# Patient Record
Sex: FEMALE | NOT HISPANIC OR LATINO | Employment: OTHER | URBAN - METROPOLITAN AREA
[De-identification: names, ages, dates, MRNs, and addresses within clinical notes are randomized per-mention and may not be internally consistent; named-entity substitution may affect disease eponyms.]

---

## 2017-01-09 ENCOUNTER — TRANSCRIBE ORDERS (OUTPATIENT)
Dept: ADMINISTRATIVE | Facility: HOSPITAL | Age: 82
End: 2017-01-09

## 2017-01-09 DIAGNOSIS — N39.0 URINARY TRACT INFECTION WITHOUT HEMATURIA, SITE UNSPECIFIED: Primary | ICD-10-CM

## 2017-01-10 ENCOUNTER — HOSPITAL ENCOUNTER (OUTPATIENT)
Dept: GENERAL RADIOLOGY | Facility: HOSPITAL | Age: 82
Discharge: HOME | End: 2017-01-10
Attending: UROLOGY | Admitting: UROLOGY

## 2017-01-10 VITALS
BODY MASS INDEX: 23.74 KG/M2 | SYSTOLIC BLOOD PRESSURE: 131 MMHG | WEIGHT: 134 LBS | HEART RATE: 60 BPM | HEIGHT: 63 IN | DIASTOLIC BLOOD PRESSURE: 62 MMHG | OXYGEN SATURATION: 98 % | RESPIRATION RATE: 16 BRPM

## 2017-01-10 DIAGNOSIS — Z45.2 PICC (PERIPHERALLY INSERTED CENTRAL CATHETER) IN PLACE: ICD-10-CM

## 2017-01-10 DIAGNOSIS — N39.0 URINARY TRACT INFECTION WITHOUT HEMATURIA, SITE UNSPECIFIED: ICD-10-CM

## 2017-01-10 PROCEDURE — 76937 US GUIDE VASCULAR ACCESS: CPT

## 2017-01-10 PROCEDURE — 25010000002 GENTAMICIN PER 80 MG: Performed by: UROLOGY

## 2017-01-10 PROCEDURE — 77001 FLUOROGUIDE FOR VEIN DEVICE: CPT

## 2017-01-10 RX ORDER — GENTAMICIN IN NACL, ISO-OSM 80MG/100ML
3 INTRAVENOUS SOLUTION, PIGGYBACK (ML) INTRAVENOUS ONCE
Status: DISCONTINUED | OUTPATIENT
Start: 2017-01-10 | End: 2017-01-10 | Stop reason: SDUPTHER

## 2017-01-10 RX ORDER — LIDOCAINE WITH 8.4% SOD BICARB 0.9%(10ML)
20 SYRINGE (ML) INJECTION ONCE
Status: COMPLETED | OUTPATIENT
Start: 2017-01-10 | End: 2017-01-10

## 2017-01-10 RX ORDER — GENTAMICIN IN NACL, ISO-OSM 80MG/100ML
3 INTRAVENOUS SOLUTION, PIGGYBACK (ML) INTRAVENOUS ONCE
Status: COMPLETED | OUTPATIENT
Start: 2017-01-10 | End: 2017-01-10

## 2017-01-10 RX ADMIN — GENTAMICIN SULFATE 160 MG: 80 INJECTION, SOLUTION INTRAVENOUS at 11:27

## 2017-01-10 RX ADMIN — Medication 9 ML: at 10:57

## 2017-09-04 ENCOUNTER — OFFICE VISIT (OUTPATIENT)
Dept: RETAIL CLINIC | Facility: CLINIC | Age: 82
End: 2017-09-04

## 2017-09-04 VITALS
RESPIRATION RATE: 16 BRPM | TEMPERATURE: 98 F | HEART RATE: 70 BPM | OXYGEN SATURATION: 98 % | SYSTOLIC BLOOD PRESSURE: 130 MMHG | DIASTOLIC BLOOD PRESSURE: 68 MMHG

## 2017-09-04 DIAGNOSIS — J01.00 ACUTE NON-RECURRENT MAXILLARY SINUSITIS: Primary | ICD-10-CM

## 2017-09-04 PROCEDURE — 99213 OFFICE O/P EST LOW 20 MIN: CPT | Performed by: NURSE PRACTITIONER

## 2017-09-04 RX ORDER — AMOXICILLIN 500 MG/1
500 CAPSULE ORAL 3 TIMES DAILY
Qty: 30 CAPSULE | Refills: 0 | Status: SHIPPED | OUTPATIENT
Start: 2017-09-04 | End: 2017-09-14

## 2017-09-04 RX ORDER — FLUTICASONE PROPIONATE 50 MCG
2 SPRAY, SUSPENSION (ML) NASAL DAILY
Qty: 1 BOTTLE | Refills: 0 | Status: SHIPPED | OUTPATIENT
Start: 2017-09-04 | End: 2017-10-26

## 2017-09-04 NOTE — PROGRESS NOTES
Subjective   Rossana Gomez is a 82 y.o. female.     Headache    This is a new problem. The current episode started in the past 7 days (3 days ). The problem has been unchanged. The pain is located in the bilateral region. The pain quality is similar to prior headaches. The quality of the pain is described as aching. The pain is mild. Associated symptoms include coughing and sinus pressure. Pertinent negatives include no fever, sore throat or vomiting. Treatments tried: victor hugo selbeeer  The treatment provided mild relief.   Cough   Associated symptoms include chills and headaches. Pertinent negatives include no fever or sore throat.        The following portions of the patient's history were reviewed and updated as appropriate: allergies, current medications, past family history, past medical history, past social history, past surgical history and problem list.    Review of Systems   Constitutional: Positive for chills. Negative for fever.   HENT: Positive for congestion and sinus pressure. Negative for sore throat.    Eyes: Negative.    Respiratory: Positive for cough.    Cardiovascular: Negative.    Gastrointestinal: Negative.  Negative for vomiting.   Endocrine: Negative.    Genitourinary: Negative.    Musculoskeletal: Negative.    Skin: Negative.    Allergic/Immunologic: Negative.    Neurological: Positive for headaches.   Hematological: Negative.    Psychiatric/Behavioral: Negative.        Objective   Physical Exam   Constitutional: She is oriented to person, place, and time. Vital signs are normal. She appears well-developed and well-nourished.   HENT:   Head: Normocephalic and atraumatic.   Right Ear: Hearing, tympanic membrane, external ear and ear canal normal.   Left Ear: Hearing, tympanic membrane, external ear and ear canal normal.   Nose: Right sinus exhibits maxillary sinus tenderness and frontal sinus tenderness. Left sinus exhibits maxillary sinus tenderness and frontal sinus tenderness.    Mouth/Throat: Uvula is midline, oropharynx is clear and moist and mucous membranes are normal. No tonsillar exudate.   Eyes: Conjunctivae and lids are normal. Pupils are equal, round, and reactive to light.   Neck: Trachea normal and normal range of motion. Neck supple.   Cardiovascular: Normal rate, regular rhythm, S1 normal, S2 normal and normal heart sounds.    Pulmonary/Chest: Effort normal and breath sounds normal. No respiratory distress.   Abdominal: Soft. Normal appearance and bowel sounds are normal. There is no tenderness.   Musculoskeletal: Normal range of motion.   Lymphadenopathy:     She has no cervical adenopathy.   Neurological: She is alert and oriented to person, place, and time.   Skin: Skin is warm, dry and intact. No rash noted.   Psychiatric: She has a normal mood and affect. Her speech is normal and behavior is normal.   Vitals reviewed.      Assessment/Plan   Problems Addressed this Visit     None      Visit Diagnoses     Acute non-recurrent maxillary sinusitis    -  Primary          amoxicllin 500 mg po tid x 10 days   flonase as directed

## 2017-10-24 ENCOUNTER — TRANSCRIBE ORDERS (OUTPATIENT)
Dept: ADMINISTRATIVE | Facility: HOSPITAL | Age: 82
End: 2017-10-24

## 2017-10-24 DIAGNOSIS — N39.0 URINARY TRACT INFECTION WITHOUT HEMATURIA, SITE UNSPECIFIED: Primary | ICD-10-CM

## 2017-10-26 ENCOUNTER — HOSPITAL ENCOUNTER (OUTPATIENT)
Dept: GENERAL RADIOLOGY | Facility: HOSPITAL | Age: 82
Discharge: HOME | End: 2017-10-26
Attending: UROLOGY | Admitting: UROLOGY

## 2017-10-26 VITALS
HEIGHT: 63 IN | DIASTOLIC BLOOD PRESSURE: 60 MMHG | WEIGHT: 130 LBS | TEMPERATURE: 97.9 F | BODY MASS INDEX: 23.04 KG/M2 | HEART RATE: 61 BPM | SYSTOLIC BLOOD PRESSURE: 132 MMHG | OXYGEN SATURATION: 99 % | RESPIRATION RATE: 16 BRPM

## 2017-10-26 DIAGNOSIS — N39.0 URINARY TRACT INFECTION WITHOUT HEMATURIA, SITE UNSPECIFIED: ICD-10-CM

## 2017-10-26 PROCEDURE — 96365 THER/PROPH/DIAG IV INF INIT: CPT

## 2017-10-26 PROCEDURE — 76937 US GUIDE VASCULAR ACCESS: CPT

## 2017-10-26 PROCEDURE — C1751 CATH, INF, PER/CENT/MIDLINE: HCPCS

## 2017-10-26 PROCEDURE — 77001 FLUOROGUIDE FOR VEIN DEVICE: CPT

## 2017-10-26 PROCEDURE — 25010000002 GENTAMICIN PER 80 MG: Performed by: UROLOGY

## 2017-10-26 RX ORDER — PRAVASTATIN SODIUM 20 MG
20 TABLET ORAL DAILY
COMMUNITY
End: 2019-06-27

## 2017-10-26 RX ORDER — SERTRALINE HYDROCHLORIDE 100 MG/1
100 TABLET, FILM COATED ORAL DAILY
COMMUNITY

## 2017-10-26 RX ORDER — LIDOCAINE HYDROCHLORIDE 10 MG/ML
10 INJECTION, SOLUTION INFILTRATION; PERINEURAL ONCE
Status: COMPLETED | OUTPATIENT
Start: 2017-10-26 | End: 2017-10-26

## 2017-10-26 RX ORDER — TEMAZEPAM 15 MG/1
15 CAPSULE ORAL NIGHTLY PRN
COMMUNITY
End: 2020-01-02

## 2017-10-26 RX ADMIN — GENTAMICIN SULFATE 180 MG: 40 INJECTION, SOLUTION INTRAMUSCULAR; INTRAVENOUS at 10:46

## 2017-10-26 RX ADMIN — LIDOCAINE HYDROCHLORIDE 8 ML: 10 INJECTION, SOLUTION INFILTRATION; PERINEURAL at 10:23

## 2017-10-26 NOTE — PROGRESS NOTES
Pharmacokinetic Consult - Aminoglycoside Dosing (Initial Note)    Rossana Gomez has been consulted for gentamicin (beginning second dose) dosing for UTI.   Pharmacy dosing per Dr. North Silva's request.   Dosing method: once daily  Goal trough: <1 mg/L      Relevant clinical data and objective history reviewed:  82 y.o. female         Ideal body weight: 115 lb 8.3 oz (52.4 kg)  Adjusted ideal body weight: 121 lb 5 oz (55 kg)      @LABBRIEF(CREATININE)@  CrCl cannot be calculated (No order found.).     No results found for: WBC  Temp Readings from Last 3 Encounters:   10/26/17 97.9 °F (36.6 °C) (Oral)   09/04/17 98 °F (36.7 °C) (Oral)   05/11/16 98.1 °F (36.7 °C) (Oral)     Baseline culture/source/susceptibility:   10/21 NICHOLE UCx: ESBL E.coli (see scanned H&P under media)    Assessment/Plan  1. Will give Gentamicin 180 mg (3 mg/kg, dosing weight of 59 kg) as ordered by MD today. Patient's last SCr in April was 1.0, considering this pt's baseline CrCl is around 37 mL/min. According to [facility] aminoglycosides dosing guidelines, pt would be a candidate for Q48h dosing schedule. Will order serum creatinine and gentamicin level tomorrow prior to administering second gentamicin dose.   2. Will monitor serum creatinine every visit while on gentamicin therapy.    Pharmacy will continue to follow daily while on an aminoglycoside and adjust as needed.  Thank you for allowing us to participate in this patient's care.      Ysabel Ziegler, ValentinD

## 2017-10-26 NOTE — NURSING NOTE
Given picc ID card to patient and given schedule of appointments for antibiotics in [facility]U. Discharged with spouse to car.

## 2017-10-26 NOTE — NURSING NOTE
Status post picc line to right upper arm with dressing dry and intact to site. Good blood return noted. Flushed picc with 10 cc normal saline before and after antibiotic.

## 2017-10-27 ENCOUNTER — HOSPITAL ENCOUNTER (OUTPATIENT)
Dept: INFUSION THERAPY | Facility: HOSPITAL | Age: 82
Discharge: HOME | End: 2017-10-27
Admitting: UROLOGY

## 2017-10-27 VITALS
RESPIRATION RATE: 16 BRPM | TEMPERATURE: 98.5 F | SYSTOLIC BLOOD PRESSURE: 138 MMHG | HEART RATE: 61 BPM | DIASTOLIC BLOOD PRESSURE: 65 MMHG | OXYGEN SATURATION: 96 %

## 2017-10-27 DIAGNOSIS — N39.0 URINARY TRACT INFECTION WITHOUT HEMATURIA, SITE UNSPECIFIED: ICD-10-CM

## 2017-10-27 LAB
CREAT BLD-MCNC: 0.96 MG/DL (ref 0.57–1)
GENTAMICIN SERPL-MCNC: 0.6 MCG/ML (ref 0.5–2)
GFR SERPL CREATININE-BSD FRML MDRD: 56 ML/MIN/1.73

## 2017-10-27 PROCEDURE — 25010000002 GENTAMICIN PER 80 MG: Performed by: UROLOGY

## 2017-10-27 PROCEDURE — 82565 ASSAY OF CREATININE: CPT | Performed by: UROLOGY

## 2017-10-27 PROCEDURE — 96365 THER/PROPH/DIAG IV INF INIT: CPT

## 2017-10-27 PROCEDURE — 80170 ASSAY OF GENTAMICIN: CPT | Performed by: UROLOGY

## 2017-10-27 PROCEDURE — 36592 COLLECT BLOOD FROM PICC: CPT

## 2017-10-27 RX ADMIN — GENTAMICIN SULFATE 180 MG: 40 INJECTION, SOLUTION INTRAMUSCULAR; INTRAVENOUS at 16:34

## 2017-10-27 NOTE — PROGRESS NOTES
Pharmacy to dose gentamicin: Follow-up    Day 2 of 7 planned  Consult for Dr Silva  Treating: UTI    Relevant clinical data and objective history reviewed:  82 y.o. female        Lab Results   Component Value Date    CREATININE 0.96 10/27/2017     Estimated Creatinine Clearance: 43.5 mL/min (by C-G formula based on Cr of 0.96).    No results found for: WBC  Temp Readings from Last 1 Encounters:   10/27/17 98.5 °F (36.9 °C) (Oral)     IV Anti-Infectives     Ordered     Dose/Rate Route Frequency Start Stop    10/27/17 1501  gentamicin (GARAMYCIN) 180 mg in sodium chloride 0.9 % 100 mL IVPB     Ordering Provider:  North Silva MD    180 mg  200 mL/hr over 30 Minutes Intravenous Once 10/27/17 1615 10/27/17 1709    10/27/17 1501  Pharmacy to Dose gentamicin (GARAMYCIN)     Ordering Provider:  North Silva MD     Does not apply Continuous PRN 10/27/17 1501           Baseline cultures/labs/radiology:  No labs available here    Gentamicin dosing history   10/26 1046: 180mg IV x1  10/27 1515 trough = 0.6; 180mg IV x1 at 1634    Lab Results   Component Value Date    GENTTROUGH 0.60 10/27/2017     No results found for: GENTRANDOM    Assessment/Plan:   Scr stable today and gentamicin trough is within goal of < 2 mcg/ml. Dosing is in between conventional and extended interval dosing, but given trough is OK today, continue at 3mg/kg dose q24h. Scr due tomorrow AM, and trough level is next due prior to 10/29 dose.  Will monitor serum creatinine every 24 hours for the first 3 days then at least every 48 hours per dosing recommendations. RN/physician should monitor for signs and symptoms of nephrotoxicity and/or ototoxicity including tinnitus, roaring, ringing or buzzing in ears, or nausea/vomiting, dizziness, and vertigo. Pharmacy will continue to follow daily while on an aminoglycoside and adjust as needed.  Thank you,    Alexa Fox, PharmD, BCPS  10/27/17 5:48 PM

## 2017-10-27 NOTE — PROGRESS NOTES
Patient tolerated infusion without complaint. Right PICC flushed with NS and patient discharged ambulatory at 1710.

## 2017-10-27 NOTE — PROGRESS NOTES
Lab results called to Alexa, Pharmacist and ok received to give 180 mg dose Gentamicin IV and to continue daily serum creatinines prior to giving medication.

## 2017-10-28 ENCOUNTER — HOSPITAL ENCOUNTER (OUTPATIENT)
Dept: INFUSION THERAPY | Facility: HOSPITAL | Age: 82
Discharge: HOME | End: 2017-10-28
Admitting: UROLOGY

## 2017-10-28 VITALS
TEMPERATURE: 97.2 F | OXYGEN SATURATION: 97 % | SYSTOLIC BLOOD PRESSURE: 133 MMHG | RESPIRATION RATE: 18 BRPM | HEART RATE: 64 BPM | DIASTOLIC BLOOD PRESSURE: 63 MMHG

## 2017-10-28 DIAGNOSIS — N39.0 URINARY TRACT INFECTION WITHOUT HEMATURIA, SITE UNSPECIFIED: ICD-10-CM

## 2017-10-28 LAB
CREAT BLD-MCNC: 1.08 MG/DL (ref 0.57–1)
GFR SERPL CREATININE-BSD FRML MDRD: 49 ML/MIN/1.73

## 2017-10-28 PROCEDURE — 25010000002 GENTAMICIN PER 80 MG: Performed by: UROLOGY

## 2017-10-28 PROCEDURE — 82565 ASSAY OF CREATININE: CPT | Performed by: UROLOGY

## 2017-10-28 PROCEDURE — 36592 COLLECT BLOOD FROM PICC: CPT

## 2017-10-28 PROCEDURE — 96365 THER/PROPH/DIAG IV INF INIT: CPT

## 2017-10-28 RX ADMIN — GENTAMICIN SULFATE 180 MG: 40 INJECTION, SOLUTION INTRAMUSCULAR; INTRAVENOUS at 09:01

## 2017-10-29 ENCOUNTER — HOSPITAL ENCOUNTER (OUTPATIENT)
Dept: INFUSION THERAPY | Facility: HOSPITAL | Age: 82
Discharge: HOME | End: 2017-10-29
Admitting: UROLOGY

## 2017-10-29 VITALS
TEMPERATURE: 97.5 F | OXYGEN SATURATION: 97 % | RESPIRATION RATE: 16 BRPM | SYSTOLIC BLOOD PRESSURE: 149 MMHG | DIASTOLIC BLOOD PRESSURE: 68 MMHG | HEART RATE: 65 BPM

## 2017-10-29 DIAGNOSIS — N39.0 URINARY TRACT INFECTION WITHOUT HEMATURIA, SITE UNSPECIFIED: ICD-10-CM

## 2017-10-29 LAB
CREAT BLD-MCNC: 0.98 MG/DL (ref 0.57–1)
GENTAMICIN SERPL-MCNC: 1.45 MCG/ML (ref 0.5–2)
GFR SERPL CREATININE-BSD FRML MDRD: 55 ML/MIN/1.73

## 2017-10-29 PROCEDURE — 96365 THER/PROPH/DIAG IV INF INIT: CPT

## 2017-10-29 PROCEDURE — 80170 ASSAY OF GENTAMICIN: CPT | Performed by: UROLOGY

## 2017-10-29 PROCEDURE — 82565 ASSAY OF CREATININE: CPT | Performed by: UROLOGY

## 2017-10-29 PROCEDURE — 25010000002 GENTAMICIN PER 80 MG: Performed by: UROLOGY

## 2017-10-29 PROCEDURE — 36592 COLLECT BLOOD FROM PICC: CPT

## 2017-10-29 RX ADMIN — GENTAMICIN SULFATE 180 MG: 40 INJECTION, SOLUTION INTRAMUSCULAR; INTRAVENOUS at 09:25

## 2017-10-30 ENCOUNTER — HOSPITAL ENCOUNTER (OUTPATIENT)
Dept: INFUSION THERAPY | Facility: HOSPITAL | Age: 82
Discharge: HOME | End: 2017-10-30
Admitting: UROLOGY

## 2017-10-30 VITALS
TEMPERATURE: 98.5 F | DIASTOLIC BLOOD PRESSURE: 64 MMHG | SYSTOLIC BLOOD PRESSURE: 156 MMHG | HEART RATE: 70 BPM | OXYGEN SATURATION: 96 % | RESPIRATION RATE: 16 BRPM

## 2017-10-30 DIAGNOSIS — N39.0 URINARY TRACT INFECTION WITHOUT HEMATURIA, SITE UNSPECIFIED: ICD-10-CM

## 2017-10-30 LAB
CREAT BLD-MCNC: 1.11 MG/DL (ref 0.57–1)
GFR SERPL CREATININE-BSD FRML MDRD: 47 ML/MIN/1.73

## 2017-10-30 PROCEDURE — 82565 ASSAY OF CREATININE: CPT | Performed by: UROLOGY

## 2017-10-30 PROCEDURE — 96365 THER/PROPH/DIAG IV INF INIT: CPT

## 2017-10-30 PROCEDURE — 25010000002 GENTAMICIN PER 80 MG: Performed by: UROLOGY

## 2017-10-30 RX ADMIN — GENTAMICIN SULFATE 180 MG: 40 INJECTION, SOLUTION INTRAMUSCULAR; INTRAVENOUS at 15:29

## 2017-10-31 ENCOUNTER — HOSPITAL ENCOUNTER (OUTPATIENT)
Dept: INFUSION THERAPY | Facility: HOSPITAL | Age: 82
Discharge: HOME | End: 2017-10-31
Admitting: UROLOGY

## 2017-10-31 VITALS
TEMPERATURE: 98.5 F | SYSTOLIC BLOOD PRESSURE: 150 MMHG | DIASTOLIC BLOOD PRESSURE: 55 MMHG | HEART RATE: 71 BPM | OXYGEN SATURATION: 96 % | RESPIRATION RATE: 20 BRPM

## 2017-10-31 DIAGNOSIS — N39.0 URINARY TRACT INFECTION WITHOUT HEMATURIA, SITE UNSPECIFIED: Primary | ICD-10-CM

## 2017-10-31 LAB
CREAT BLD-MCNC: 0.88 MG/DL (ref 0.57–1)
GFR SERPL CREATININE-BSD FRML MDRD: 62 ML/MIN/1.73

## 2017-10-31 PROCEDURE — 25010000002 GENTAMICIN PER 80 MG: Performed by: UROLOGY

## 2017-10-31 PROCEDURE — 36415 COLL VENOUS BLD VENIPUNCTURE: CPT

## 2017-10-31 PROCEDURE — 82565 ASSAY OF CREATININE: CPT | Performed by: UROLOGY

## 2017-10-31 PROCEDURE — 96365 THER/PROPH/DIAG IV INF INIT: CPT

## 2017-10-31 RX ADMIN — GENTAMICIN SULFATE 180 MG: 40 INJECTION, SOLUTION INTRAMUSCULAR; INTRAVENOUS at 09:54

## 2017-11-01 ENCOUNTER — HOSPITAL ENCOUNTER (OUTPATIENT)
Dept: INFUSION THERAPY | Facility: HOSPITAL | Age: 82
Discharge: HOME | End: 2017-11-01
Admitting: UROLOGY

## 2017-11-01 VITALS
OXYGEN SATURATION: 94 % | RESPIRATION RATE: 20 BRPM | SYSTOLIC BLOOD PRESSURE: 139 MMHG | HEART RATE: 70 BPM | TEMPERATURE: 95.6 F | DIASTOLIC BLOOD PRESSURE: 61 MMHG

## 2017-11-01 DIAGNOSIS — N39.0 URINARY TRACT INFECTION WITHOUT HEMATURIA, SITE UNSPECIFIED: ICD-10-CM

## 2017-11-01 LAB
CREAT BLD-MCNC: 0.98 MG/DL (ref 0.57–1)
GFR SERPL CREATININE-BSD FRML MDRD: 55 ML/MIN/1.73

## 2017-11-01 PROCEDURE — 82565 ASSAY OF CREATININE: CPT | Performed by: UROLOGY

## 2017-11-01 PROCEDURE — 25010000002 GENTAMICIN PER 80 MG: Performed by: UROLOGY

## 2017-11-01 PROCEDURE — G0463 HOSPITAL OUTPT CLINIC VISIT: HCPCS

## 2017-11-01 PROCEDURE — 36592 COLLECT BLOOD FROM PICC: CPT

## 2017-11-01 PROCEDURE — 96365 THER/PROPH/DIAG IV INF INIT: CPT

## 2017-11-01 RX ADMIN — GENTAMICIN SULFATE 180 MG: 40 INJECTION, SOLUTION INTRAMUSCULAR; INTRAVENOUS at 08:42

## 2017-11-01 NOTE — PROGRESS NOTES
PICC line flushed with 10cc of normal saline before and after infusion.  PICC dressing changed according to policy/procedure using sterile technique.  Inseertion site clear.  Dressing dry and intact. Discharged home ambulatory.

## 2017-11-08 ENCOUNTER — HOSPITAL ENCOUNTER (OUTPATIENT)
Dept: INFUSION THERAPY | Facility: HOSPITAL | Age: 82
Discharge: HOME | End: 2017-11-08
Admitting: UROLOGY

## 2017-11-08 VITALS
DIASTOLIC BLOOD PRESSURE: 67 MMHG | TEMPERATURE: 96.7 F | OXYGEN SATURATION: 96 % | HEART RATE: 75 BPM | SYSTOLIC BLOOD PRESSURE: 149 MMHG | RESPIRATION RATE: 20 BRPM

## 2017-11-08 DIAGNOSIS — N39.0 URINARY TRACT INFECTION WITHOUT HEMATURIA, SITE UNSPECIFIED: Primary | ICD-10-CM

## 2017-11-08 PROCEDURE — G0463 HOSPITAL OUTPT CLINIC VISIT: HCPCS

## 2017-11-08 NOTE — PROGRESS NOTES
1515- paged Dr. Silva. Office to fax UA results.  Multiple calls to office regarding whether to d/c picc line or not. Awaiting a call back. Call received from office at 1700. Office to fax order to discontinue picc line. Order received at 1705.

## 2018-07-19 ENCOUNTER — TRANSCRIBE ORDERS (OUTPATIENT)
Dept: ADMINISTRATIVE | Facility: HOSPITAL | Age: 83
End: 2018-07-19

## 2018-07-19 ENCOUNTER — HOSPITAL ENCOUNTER (OUTPATIENT)
Dept: GENERAL RADIOLOGY | Facility: HOSPITAL | Age: 83
Discharge: HOME | End: 2018-07-19
Attending: UROLOGY | Admitting: UROLOGY

## 2018-07-19 VITALS
WEIGHT: 128 LBS | HEART RATE: 57 BPM | BODY MASS INDEX: 22.68 KG/M2 | OXYGEN SATURATION: 97 % | RESPIRATION RATE: 18 BRPM | DIASTOLIC BLOOD PRESSURE: 60 MMHG | TEMPERATURE: 97.8 F | SYSTOLIC BLOOD PRESSURE: 127 MMHG | HEIGHT: 63 IN

## 2018-07-19 DIAGNOSIS — N39.0 URINARY TRACT INFECTION WITHOUT HEMATURIA, SITE UNSPECIFIED: Primary | ICD-10-CM

## 2018-07-19 DIAGNOSIS — N39.0 URINARY TRACT INFECTION WITHOUT HEMATURIA, SITE UNSPECIFIED: ICD-10-CM

## 2018-07-19 PROCEDURE — 25010000002 ERTAPENEM 1 GM/100ML SOLUTION: Performed by: UROLOGY

## 2018-07-19 PROCEDURE — 76937 US GUIDE VASCULAR ACCESS: CPT

## 2018-07-19 PROCEDURE — C1751 CATH, INF, PER/CENT/MIDLINE: HCPCS

## 2018-07-19 PROCEDURE — 77001 FLUOROGUIDE FOR VEIN DEVICE: CPT

## 2018-07-19 RX ORDER — LIDOCAINE HYDROCHLORIDE 10 MG/ML
10 INJECTION, SOLUTION INFILTRATION; PERINEURAL ONCE
Status: COMPLETED | OUTPATIENT
Start: 2018-07-19 | End: 2018-07-19

## 2018-07-19 RX ADMIN — LIDOCAINE HYDROCHLORIDE 8 ML: 10 INJECTION, SOLUTION INFILTRATION; PERINEURAL at 13:33

## 2018-07-19 RX ADMIN — ERTAPENEM SODIUM 1 G: 1 INJECTION, POWDER, LYOPHILIZED, FOR SOLUTION INTRAMUSCULAR; INTRAVENOUS at 14:12

## 2018-07-19 NOTE — NURSING NOTE
Antibiotic complete.  Flushed and wrapped with coban. Patient given card and discharge care instructions

## 2018-07-20 ENCOUNTER — HOSPITAL ENCOUNTER (OUTPATIENT)
Dept: INFUSION THERAPY | Facility: HOSPITAL | Age: 83
Discharge: HOME | End: 2018-07-20
Attending: UROLOGY | Admitting: UROLOGY

## 2018-07-20 VITALS
HEART RATE: 57 BPM | TEMPERATURE: 97.9 F | OXYGEN SATURATION: 99 % | SYSTOLIC BLOOD PRESSURE: 148 MMHG | RESPIRATION RATE: 16 BRPM | DIASTOLIC BLOOD PRESSURE: 60 MMHG

## 2018-07-20 DIAGNOSIS — N39.0 URINARY TRACT INFECTION WITHOUT HEMATURIA, SITE UNSPECIFIED: ICD-10-CM

## 2018-07-20 PROCEDURE — 96365 THER/PROPH/DIAG IV INF INIT: CPT

## 2018-07-20 PROCEDURE — 25010000002 ERTAPENEM PER 500 MG: Performed by: UROLOGY

## 2018-07-20 RX ORDER — ESOMEPRAZOLE MAGNESIUM 40 MG/1
40 CAPSULE, DELAYED RELEASE ORAL
COMMUNITY
End: 2020-01-02

## 2018-07-20 RX ADMIN — SODIUM CHLORIDE 1 G: 900 INJECTION INTRAVENOUS at 11:07

## 2018-07-20 NOTE — PATIENT INSTRUCTIONS
Ertapenem injection  What is this medicine?  ERTAPENEM (er ta PEN em) is a carbapenem antibiotic. It is used to treat certain kinds of bacterial infections. It will not work for colds, flu, or other viral infections.  This medicine may be used for other purposes; ask your health care provider or pharmacist if you have questions.  COMMON BRAND NAME(S): Invanz  What should I tell my health care provider before I take this medicine?  They need to know if you have any of these conditions:  -brain tumor, lesion  -kidney disease  -seizure disorder  -an unusual or allergic reaction to ertapenem, other antibiotics, amide local anesthetics like lidocaine, or other medicines, foods, dyes, or preservatives  -pregnant or trying to get pregnant  -breast-feeding  How should I use this medicine?  This medicine is infused into a vein or injected deep into a muscle. It is usually given by a health care professional in a hospital or clinic setting.  If you get this medicine at home, you will be taught how to prepare and give this medicine. Use exactly as directed. Take your medicine at regular intervals. Do not take your medicine more often than directed.  It is important that you put your used needles and syringes in a special sharps container. Do not put them in a trash can. If you do not have a sharps container, call your pharmacist or healthcare provider to get one.  Talk to your pediatrician regarding the use of this medicine in children. While this drug may be prescribed for children as young as 3 months old for selected conditions, precautions do apply.  Overdosage: If you think you have taken too much of this medicine contact a poison control center or emergency room at once.  NOTE: This medicine is only for you. Do not share this medicine with others.  What if I miss a dose?  If you miss a dose, take it as soon as you can. If it is almost time for your next dose, take only that dose. Do not take double or extra doses.  What  may interact with this medicine?  -birth control pills  -probenecid  This list may not describe all possible interactions. Give your health care provider a list of all the medicines, herbs, non-prescription drugs, or dietary supplements you use. Also tell them if you smoke, drink alcohol, or use illegal drugs. Some items may interact with your medicine.  What should I watch for while using this medicine?  Tell your doctor or health care professional if your symptoms do not improve or if you get new symptoms. Your doctor will monitor your condition and blood work as needed.  Do not treat diarrhea with over the counter products. Contact your doctor if you have diarrhea that lasts more than 2 days or if it is severe and watery.  What side effects may I notice from receiving this medicine?  Side effects that you should report to your doctor or health care professional as soon as possible:  -allergic reactions like skin rash, itching or hives, swelling of the face, lips, or tongue  -anxiety, confusion, dizzy  -chest pain  -difficulty breathing, wheezing  -edema, swelling  -fever  -irregular heart rate, blood pressure  -pain or difficulty passing urine  -seizures  -unusually weak or tired  -white or red patches in mouth  Side effects that usually do not require medical attention (report to your doctor or health care professional if they continue or are bothersome):  -constipation or diarrhea  -difficulty sleeping  -headache  -nausea, vomiting  -pain, swelling or irritation where injected  -stomach upset  -vaginal itch, irritation  This list may not describe all possible side effects. Call your doctor for medical advice about side effects. You may report side effects to FDA at 3-910-FDA-9505.  Where should I keep my medicine?  Keep out of the reach of children.  You will be instructed on how to store this medicine. Throw away any unused medicine after the expiration date on the label.  NOTE: This sheet is a summary. It may  not cover all possible information. If you have questions about this medicine, talk to your doctor, pharmacist, or health care provider.  © 2018 Elsevier/Gold Standard (2014-07-25 07:36:44)

## 2018-07-20 NOTE — PROGRESS NOTES
SAWYER PICC flushed with normal saline before and after infusion. Patient ambulatory, D/C'd from ACU at 1147.

## 2018-07-21 ENCOUNTER — HOSPITAL ENCOUNTER (OUTPATIENT)
Dept: INFUSION THERAPY | Facility: HOSPITAL | Age: 83
Discharge: HOME | End: 2018-07-21
Admitting: UROLOGY

## 2018-07-21 VITALS
TEMPERATURE: 97.9 F | DIASTOLIC BLOOD PRESSURE: 83 MMHG | HEART RATE: 65 BPM | RESPIRATION RATE: 18 BRPM | SYSTOLIC BLOOD PRESSURE: 128 MMHG | OXYGEN SATURATION: 97 %

## 2018-07-21 DIAGNOSIS — N39.0 URINARY TRACT INFECTION WITHOUT HEMATURIA, SITE UNSPECIFIED: ICD-10-CM

## 2018-07-21 PROCEDURE — 25010000002 ERTAPENEM PER 500 MG: Performed by: UROLOGY

## 2018-07-21 PROCEDURE — 96365 THER/PROPH/DIAG IV INF INIT: CPT

## 2018-07-21 RX ADMIN — SODIUM CHLORIDE 1 G: 900 INJECTION INTRAVENOUS at 09:25

## 2018-07-21 NOTE — PROGRESS NOTES
"Pt states she just feels \"bad\" all over.  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  Pt given breakfast tray and coffee.  Right PICC line flushed easily with good blood return.  Pt tolerated infusion well. Pt dc'ed ambulatory at 0955.  "

## 2018-07-22 ENCOUNTER — HOSPITAL ENCOUNTER (OUTPATIENT)
Dept: INFUSION THERAPY | Facility: HOSPITAL | Age: 83
Discharge: HOME | End: 2018-07-22
Admitting: UROLOGY

## 2018-07-22 VITALS
RESPIRATION RATE: 16 BRPM | SYSTOLIC BLOOD PRESSURE: 150 MMHG | HEART RATE: 69 BPM | TEMPERATURE: 97.1 F | DIASTOLIC BLOOD PRESSURE: 65 MMHG | OXYGEN SATURATION: 99 %

## 2018-07-22 DIAGNOSIS — N39.0 URINARY TRACT INFECTION WITHOUT HEMATURIA, SITE UNSPECIFIED: ICD-10-CM

## 2018-07-22 PROCEDURE — 25010000002 ERTAPENEM PER 500 MG: Performed by: UROLOGY

## 2018-07-22 PROCEDURE — 96365 THER/PROPH/DIAG IV INF INIT: CPT

## 2018-07-22 RX ADMIN — SODIUM CHLORIDE 1 G: 900 INJECTION INTRAVENOUS at 09:18

## 2018-07-22 NOTE — PROGRESS NOTES
Pt states she still feels bad and has had diarrhea.  Pt states she does take a probiotic and that she has taken this medication before without issue.  Instructed pt to continue to monitor her symptoms and notify her Dr if they continue to worsen. Pt tolerated infusion well and dc'ed ambulatory at 0956.

## 2018-07-23 ENCOUNTER — HOSPITAL ENCOUNTER (OUTPATIENT)
Dept: INFUSION THERAPY | Facility: HOSPITAL | Age: 83
Discharge: HOME | End: 2018-07-23
Admitting: UROLOGY

## 2018-07-23 VITALS
RESPIRATION RATE: 16 BRPM | HEART RATE: 57 BPM | DIASTOLIC BLOOD PRESSURE: 54 MMHG | TEMPERATURE: 97.9 F | SYSTOLIC BLOOD PRESSURE: 137 MMHG | OXYGEN SATURATION: 95 %

## 2018-07-23 DIAGNOSIS — N39.0 URINARY TRACT INFECTION WITHOUT HEMATURIA, SITE UNSPECIFIED: ICD-10-CM

## 2018-07-23 PROCEDURE — 96365 THER/PROPH/DIAG IV INF INIT: CPT

## 2018-07-23 PROCEDURE — 25010000002 ERTAPENEM PER 500 MG: Performed by: UROLOGY

## 2018-07-23 RX ADMIN — SODIUM CHLORIDE 1 G: 900 INJECTION INTRAVENOUS at 12:38

## 2018-07-23 NOTE — PROGRESS NOTES
Right PICC line and dressing intact with good blood return and flushes easily. Patient states feeling OK today.

## 2018-07-24 ENCOUNTER — HOSPITAL ENCOUNTER (OUTPATIENT)
Dept: INFUSION THERAPY | Facility: HOSPITAL | Age: 83
Discharge: HOME | End: 2018-07-24
Admitting: UROLOGY

## 2018-07-24 VITALS
RESPIRATION RATE: 16 BRPM | SYSTOLIC BLOOD PRESSURE: 111 MMHG | HEART RATE: 54 BPM | OXYGEN SATURATION: 97 % | TEMPERATURE: 95.8 F | DIASTOLIC BLOOD PRESSURE: 50 MMHG

## 2018-07-24 DIAGNOSIS — N39.0 URINARY TRACT INFECTION WITHOUT HEMATURIA, SITE UNSPECIFIED: ICD-10-CM

## 2018-07-24 PROCEDURE — 96365 THER/PROPH/DIAG IV INF INIT: CPT

## 2018-07-24 PROCEDURE — 25010000002 ERTAPENEM PER 500 MG: Performed by: UROLOGY

## 2018-07-24 RX ADMIN — SODIUM CHLORIDE 1 G: 900 INJECTION INTRAVENOUS at 11:33

## 2018-07-24 NOTE — PROGRESS NOTES
Pt tolerated infusion well.  Right PICC line flushed easily with good blood return.  Pt dc'ed ambulatory at 1208.

## 2018-07-25 ENCOUNTER — HOSPITAL ENCOUNTER (OUTPATIENT)
Dept: INFUSION THERAPY | Facility: HOSPITAL | Age: 83
Discharge: HOME | End: 2018-07-25
Admitting: UROLOGY

## 2018-07-25 VITALS
OXYGEN SATURATION: 100 % | TEMPERATURE: 97.5 F | RESPIRATION RATE: 16 BRPM | HEART RATE: 60 BPM | SYSTOLIC BLOOD PRESSURE: 120 MMHG | DIASTOLIC BLOOD PRESSURE: 47 MMHG

## 2018-07-25 DIAGNOSIS — N39.0 URINARY TRACT INFECTION WITHOUT HEMATURIA, SITE UNSPECIFIED: ICD-10-CM

## 2018-07-25 PROCEDURE — 96365 THER/PROPH/DIAG IV INF INIT: CPT

## 2018-07-25 PROCEDURE — 25010000002 ERTAPENEM PER 500 MG: Performed by: UROLOGY

## 2018-07-25 RX ADMIN — SODIUM CHLORIDE 1 G: 900 INJECTION INTRAVENOUS at 09:10

## 2018-07-25 NOTE — PROGRESS NOTES
Pt tolerated infusion well.  Right PICC line flushed easily with good blood return.  Pt dc'ed ambulatory at 0952.

## 2018-07-26 ENCOUNTER — HOSPITAL ENCOUNTER (OUTPATIENT)
Dept: INFUSION THERAPY | Facility: HOSPITAL | Age: 83
Discharge: HOME | End: 2018-07-26
Admitting: UROLOGY

## 2018-07-26 VITALS
RESPIRATION RATE: 16 BRPM | DIASTOLIC BLOOD PRESSURE: 63 MMHG | TEMPERATURE: 96.9 F | OXYGEN SATURATION: 96 % | HEART RATE: 59 BPM | SYSTOLIC BLOOD PRESSURE: 142 MMHG

## 2018-07-26 DIAGNOSIS — N39.0 URINARY TRACT INFECTION WITHOUT HEMATURIA, SITE UNSPECIFIED: ICD-10-CM

## 2018-07-26 PROCEDURE — 25010000002 ERTAPENEM PER 500 MG: Performed by: UROLOGY

## 2018-07-26 PROCEDURE — G0463 HOSPITAL OUTPT CLINIC VISIT: HCPCS

## 2018-07-26 PROCEDURE — 96365 THER/PROPH/DIAG IV INF INIT: CPT

## 2018-07-26 RX ADMIN — SODIUM CHLORIDE 1 G: 900 INJECTION INTRAVENOUS at 11:01

## 2018-07-26 NOTE — PROGRESS NOTES
Right PICC dsg changed under sterile technique. New biopatch, stat lock, and ultra site valve placed.  Patient tolerated infusion without complaint. Patient discharged ambulatory at 1140.

## 2018-07-27 ENCOUNTER — HOSPITAL ENCOUNTER (OUTPATIENT)
Dept: INFUSION THERAPY | Facility: HOSPITAL | Age: 83
Discharge: HOME | End: 2018-07-27
Admitting: UROLOGY

## 2018-07-27 VITALS
SYSTOLIC BLOOD PRESSURE: 140 MMHG | RESPIRATION RATE: 16 BRPM | DIASTOLIC BLOOD PRESSURE: 64 MMHG | TEMPERATURE: 96.3 F | OXYGEN SATURATION: 99 % | HEART RATE: 66 BPM

## 2018-07-27 DIAGNOSIS — N39.0 URINARY TRACT INFECTION WITHOUT HEMATURIA, SITE UNSPECIFIED: ICD-10-CM

## 2018-07-27 PROCEDURE — 96365 THER/PROPH/DIAG IV INF INIT: CPT

## 2018-07-27 PROCEDURE — 25010000002 ERTAPENEM PER 500 MG: Performed by: UROLOGY

## 2018-07-27 RX ADMIN — SODIUM CHLORIDE 1 G: 900 INJECTION INTRAVENOUS at 11:05

## 2018-07-27 NOTE — PROGRESS NOTES
Pt tolerated transfusion well.  Pt to follow up with  for lab draw and urine sample.  Pt dc'ed ambulatory.

## 2018-08-02 ENCOUNTER — TRANSCRIBE ORDERS (OUTPATIENT)
Dept: ADMINISTRATIVE | Facility: HOSPITAL | Age: 83
End: 2018-08-02

## 2018-08-02 DIAGNOSIS — N39.0 URINARY TRACT INFECTION WITHOUT HEMATURIA, SITE UNSPECIFIED: Primary | ICD-10-CM

## 2018-08-03 ENCOUNTER — HOSPITAL ENCOUNTER (OUTPATIENT)
Dept: INFUSION THERAPY | Facility: HOSPITAL | Age: 83
Discharge: HOME | End: 2018-08-03
Admitting: UROLOGY

## 2018-08-03 VITALS
HEART RATE: 66 BPM | OXYGEN SATURATION: 97 % | TEMPERATURE: 97.7 F | DIASTOLIC BLOOD PRESSURE: 53 MMHG | SYSTOLIC BLOOD PRESSURE: 137 MMHG | RESPIRATION RATE: 20 BRPM

## 2018-08-03 DIAGNOSIS — N39.0 URINARY TRACT INFECTION WITHOUT HEMATURIA, SITE UNSPECIFIED: ICD-10-CM

## 2018-08-03 PROCEDURE — G0463 HOSPITAL OUTPT CLINIC VISIT: HCPCS

## 2018-08-03 NOTE — PROGRESS NOTES
Pt tolerated PICC line removal with no complaints or issues.  Monitored for 30 minutes per protocol in recliner.  Once line was removed cleaned with chlorhexidine per protocol, petroleum gauze, 4x4 & coflex to site.  Pt DC per ambulation @ 7185.

## 2018-08-03 NOTE — PATIENT INSTRUCTIONS
PICC Removal, Care After  Refer to this sheet in the next few weeks. These instructions provide you with information on caring for yourself after your procedure. Your health care provider may also give you more specific instructions. Your treatment has been planned according to current medical practices, but problems sometimes occur. Call your health care provider if you have any problems or questions after your procedure.  What can I expect after the procedure?  After your procedure, it is typical to have mild discomfort at the insertion site. This should not last for more than a day.  Follow these instructions at home:  You may remove the bandage after 24 hours. The PICC insertion site is very small. A small scab may develop over the insertion site.  It is okay to wash the site gently with soap and water. Be careful not to remove or pick off the scab. Gently pat the site dry after washing it. You do not need to put another bandage over the insertion site.  Do not lift anything heavy or do strenuous physical activity for 24 hours after the PICC is removed. This includes:  · Weight lifting.  · Strenuous yard work.  · Any physical activity with repetitive arm movement.    Contact a health care provider if:  · You have swelling or puffiness in your arm at the PICC insertion site.  · You have increasing tenderness at the PICC insertion site.  Get help right away if:  · You have numbness or tingling in your fingers, hand, or arm.  · Your arm looks blue and feels cold.  · You have redness around the insertion site or a red streak goes up your arm.  · You have any type of drainage from the PICC insertion site. This includes drainage such as:  ? Bleeding from the insertion site. If this happens, apply firm, direct pressure to the PICC insertion site with a clean towel.  ? Drainage that is yellow or tan.  · You have a fever.  This information is not intended to replace advice given to you by your health care provider. Make  sure you discuss any questions you have with your health care provider.  Document Released: 12/23/2014 Document Revised: 05/25/2017 Document Reviewed: 10/10/2014  Elsevier Interactive Patient Education © 2017 Elsevier Inc.

## 2019-06-11 PROBLEM — E03.9 HYPOTHYROIDISM: Status: ACTIVE | Noted: 2018-06-13

## 2019-06-11 PROBLEM — Z86.19 HX OF HERPES ZOSTER: Status: ACTIVE | Noted: 2019-06-11

## 2019-06-11 PROBLEM — R00.2 PALPITATIONS: Status: ACTIVE | Noted: 2019-06-11

## 2019-06-11 PROBLEM — Z87.898 HISTORY OF URINARY FREQUENCY: Status: ACTIVE | Noted: 2019-06-11

## 2019-06-11 PROBLEM — E78.5 DYSLIPIDEMIA: Status: ACTIVE | Noted: 2019-06-11

## 2019-06-11 PROBLEM — Z92.89 HISTORY OF NUCLEAR STRESS TEST: Status: ACTIVE | Noted: 2019-06-11

## 2019-06-11 PROBLEM — Z92.89 H/O ECHOCARDIOGRAM: Status: ACTIVE | Noted: 2019-06-11

## 2019-06-11 PROBLEM — Z87.39 HISTORY OF PSEUDOGOUT: Status: ACTIVE | Noted: 2019-06-11

## 2019-06-11 PROBLEM — G61.0 GUILLAIN BARRÉ SYNDROME (HCC): Status: ACTIVE | Noted: 2019-06-11

## 2019-06-11 PROBLEM — Z98.890 HISTORY OF HOLTER MONITORING: Status: ACTIVE | Noted: 2019-06-11

## 2019-06-11 PROBLEM — F32.A DEPRESSION: Status: ACTIVE | Noted: 2019-06-11

## 2019-06-11 PROBLEM — K21.9 GERD (GASTROESOPHAGEAL REFLUX DISEASE): Status: ACTIVE | Noted: 2019-06-11

## 2019-06-11 PROBLEM — Z86.69 HISTORY OF RESTLESS LEGS SYNDROME: Status: ACTIVE | Noted: 2019-06-11

## 2019-06-11 PROBLEM — I10 ESSENTIAL HYPERTENSION: Status: ACTIVE | Noted: 2019-06-11

## 2019-06-11 PROBLEM — C43.9 MALIGNANT MELANOMA (HCC): Status: ACTIVE | Noted: 2019-06-11

## 2019-06-27 ENCOUNTER — OFFICE VISIT (OUTPATIENT)
Dept: CARDIOLOGY | Facility: CLINIC | Age: 84
End: 2019-06-27

## 2019-06-27 VITALS
DIASTOLIC BLOOD PRESSURE: 42 MMHG | HEART RATE: 64 BPM | OXYGEN SATURATION: 97 % | HEIGHT: 63 IN | BODY MASS INDEX: 22.5 KG/M2 | WEIGHT: 127 LBS | SYSTOLIC BLOOD PRESSURE: 110 MMHG

## 2019-06-27 DIAGNOSIS — E78.5 HYPERLIPIDEMIA, UNSPECIFIED HYPERLIPIDEMIA TYPE: ICD-10-CM

## 2019-06-27 DIAGNOSIS — I10 ESSENTIAL HYPERTENSION: Primary | ICD-10-CM

## 2019-06-27 DIAGNOSIS — R00.2 PALPITATIONS: ICD-10-CM

## 2019-06-27 PROCEDURE — 99213 OFFICE O/P EST LOW 20 MIN: CPT | Performed by: INTERNAL MEDICINE

## 2019-06-27 RX ORDER — PRAVASTATIN SODIUM 40 MG
40 TABLET ORAL DAILY
COMMUNITY
Start: 2019-04-30

## 2019-06-27 NOTE — PROGRESS NOTES
[facility]        Subjective:     Encounter Date:2019      Patient ID: Rossana Gomez is a 84 y.o. female.      HPI: I saw Rossana Paiz.  She is a very sweet fman today for continued cardiovascular care 84-year-old female whose  recently .  She is sad and melancholy but overall has maintained her activity level.  She does not report any chest pain pressure tightness.  She is free of progressive dyspnea on exertion and does not report orthopnea or PND no lower extremity edema.  She denies any syncope near syncope or palpitation.  She has a history of hypertension which is well controlled in fact has decreased to 110/42 but is she has had some weight reduction during this sad time.  She has a history of dyslipidemia and remains on pravastatin 20 mg/day which is monitored by Dr. Lopez.    Stress nuclear from 2018 revealed normal left ventricular contractility no evidence of ischemia or infarction.  Echocardiogram at that time confirms normal left ventricular contractility, mild concentric left ventricular hypertrophy mild mitral and tricuspid insufficiency.    The following portions of the patient's history were reviewed and updated as appropriate: allergies, current medications, past family history, past medical history, past social history, past surgical history and problem list.    Problem List:  Patient Active Problem List   Diagnosis   • UTI (urinary tract infection)   • Hypothyroidism   • GERD (gastroesophageal reflux disease)   • Essential hypertension   • Dyslipidemia   • Palpitations   • Malignant melanoma (CMS/HCC)   • Depression   • H/O echocardiogram   • History of Holter monitoring   • History of nuclear stress test   • Guillain Barré syndrome (CMS/HCC)   • Hx of herpes zoster   • History of pseudogout   • History of restless legs syndrome   • History of urinary frequency       Past Medical History:  Past Medical History:   Diagnosis Date   • Arthritis    •  Cancer (CMS/HCC)    • Chronic back pain    • Disease of thyroid gland    • GERD (gastroesophageal reflux disease)    • Guillain-Hoffman disease (CMS/HCC)     when 32 years old   • Hypertension    • Melanoma (CMS/HCC)     left forearm   • Restless leg syndrome    • UTI (urinary tract infection)    • Wears glasses        Past Surgical History:  Past Surgical History:   Procedure Laterality Date   • APPENDECTOMY     • CATARACT EXTRACTION, BILATERAL     • CHOLECYSTECTOMY     • ORTHOPEDIC SURGERY     • REPLACEMENT TOTAL KNEE      Both left and right   • SKIN CANCER EXCISION Left     left medrano   • THYROIDECTOMY, PARTIAL         Social History:  Social History     Socioeconomic History   • Marital status:      Spouse name: Not on file   • Number of children: Not on file   • Years of education: Not on file   • Highest education level: Not on file   Tobacco Use   • Smoking status: Never Smoker   • Smokeless tobacco: Never Used   Substance and Sexual Activity   • Alcohol use: No   • Drug use: No   • Sexual activity: Defer       Allergies:  Allergies   Allergen Reactions   • Cyclobenzaprine Hallucinations     Hallucinations    • Estrogens    • Influenza Vaccines    • Morphine Nausea And Vomiting   • Nitrofurantoin      Flu like systems  ( Macrodantin)   • Sulfa Antibiotics    • Trazodone    • Soma [Carisoprodol] Other (See Comments)     faint         ROS:  Review of Systems   Constitution: Negative for weakness and malaise/fatigue.   HENT: Negative for hearing loss and nosebleeds.    Eyes: Negative for double vision and visual disturbance.   Cardiovascular: Negative for chest pain, claudication, dyspnea on exertion, near-syncope, orthopnea, palpitations, paroxysmal nocturnal dyspnea and syncope.   Respiratory: Negative for cough, shortness of breath, sleep disturbances due to breathing, snoring, sputum production and wheezing.    Endocrine: Negative for cold intolerance, heat intolerance, polydipsia and polyuria.  "  Hematologic/Lymphatic: Negative for adenopathy and bleeding problem. Does not bruise/bleed easily.   Skin: Negative for flushing and itching.   Musculoskeletal: Negative for back pain, falls, joint pain, joint swelling, muscle weakness and neck pain.   Gastrointestinal: Negative for abdominal pain, dysphagia, heartburn, nausea and vomiting.   Genitourinary: Negative for dysuria and frequency.   Neurological: Negative for disturbances in coordination, dizziness, light-headedness, loss of balance and numbness.   Psychiatric/Behavioral: Negative for altered mental status and memory loss. The patient is not nervous/anxious.    Allergic/Immunologic: Negative for hives and persistent infections.          Objective:         /42 (BP Location: Left arm, Patient Position: Sitting, Cuff Size: Adult)   Pulse 64   Ht 160 cm (63\")   Wt 57.6 kg (127 lb)   SpO2 97%   BMI 22.50 kg/m²     Physical Exam   Constitutional: She is oriented to person, place, and time. She appears well-developed and well-nourished.   HENT:   Head: Normocephalic and atraumatic.   Eyes: Conjunctivae and EOM are normal. Pupils are equal, round, and reactive to light.   Neck: Neck supple. No thyromegaly present.   Cardiovascular: Normal rate, regular rhythm, S1 normal, S2 normal and intact distal pulses. PMI is not displaced. Exam reveals gallop and S4. Exam reveals no S3, no distant heart sounds, no friction rub, no midsystolic click and no opening snap.   No murmur heard.  Pulses:       Carotid pulses are 2+ on the right side, and 2+ on the left side.       Radial pulses are 2+ on the right side, and 2+ on the left side.        Femoral pulses are 2+ on the right side, and 2+ on the left side.       Dorsalis pedis pulses are 2+ on the right side, and 2+ on the left side.        Posterior tibial pulses are 2+ on the right side, and 2+ on the left side.   1/6 systolic murmur left sternal border   Pulmonary/Chest: Effort normal and breath sounds " normal. No respiratory distress. She has no wheezes. She has no rales.   Abdominal: Soft. Bowel sounds are normal. She exhibits no distension and no mass. There is no tenderness.   Musculoskeletal: Normal range of motion. She exhibits no edema.   Neurological: She is alert and oriented to person, place, and time.   Skin: Skin is warm and dry. No erythema.   Psychiatric: She has a normal mood and affect.       In-Office Procedure(s):  Procedures    ASCVD RIsk Score::  The ASCVD Risk score (Xochitl DC Jr., et al., 2013) failed to calculate for the following reasons:    The 2013 ASCVD risk score is only valid for ages 40 to 79        Assessment/Plan:         1. Essential hypertension  Controlled, we will reduce amlodipine to 5 mg a day given her recent weight reduction    2. Palpitations  Resolved    3. Hyperlipidemia, unspecified hyperlipidemia type  Remains on pravastatin and monitored by Dr. Lopez    I feel Ms. is stable from the cardiovascular standpoint.  This is a difficult time for herCoffman and to avoid any orthostatic symptoms with weight reduction I have reduced her amlodipine.  I will see her in follow-up in 6 months but of course sooner if needed.       Jem Munoz MD  06/27/19  .

## 2020-01-02 ENCOUNTER — OFFICE VISIT (OUTPATIENT)
Dept: CARDIOLOGY | Facility: CLINIC | Age: 85
End: 2020-01-02

## 2020-01-02 VITALS
HEIGHT: 63 IN | BODY MASS INDEX: 22.15 KG/M2 | HEART RATE: 60 BPM | SYSTOLIC BLOOD PRESSURE: 134 MMHG | WEIGHT: 125 LBS | DIASTOLIC BLOOD PRESSURE: 58 MMHG

## 2020-01-02 DIAGNOSIS — R00.2 PALPITATIONS: ICD-10-CM

## 2020-01-02 DIAGNOSIS — I10 ESSENTIAL HYPERTENSION: Primary | ICD-10-CM

## 2020-01-02 DIAGNOSIS — E78.5 DYSLIPIDEMIA: ICD-10-CM

## 2020-01-02 PROCEDURE — 99213 OFFICE O/P EST LOW 20 MIN: CPT | Performed by: INTERNAL MEDICINE

## 2020-01-02 NOTE — PROGRESS NOTES
[facility]        Subjective:     Encounter Date:01/02/2020      Patient ID: Rossana Gomez is a 84 y.o. female.      HPI: I saw Rossana Gomez today for cardiovascular care.  She is a very pleasant 84-year-old female who remains melancholy following the death of her .  Nevertheless she has maintained her activity level and does not report chest pain pressure tightness.  She has her baseline dyspnea on exertion but this is stable.  She does have some epigastric discomfort following eating spicy foods which is relieved with antacids.  She does not experience orthopnea or PND no lower extremity edema.  She is free of syncope near syncope or palpitations.  She continues to tolerate her current medical regimen well no significant side effects.  Her lipids are monitored by Dr. Gaming.      The following portions of the patient's history were reviewed and updated as appropriate: allergies, current medications, past family history, past medical history, past social history, past surgical history and problem list.    Problem List:  Patient Active Problem List   Diagnosis   • UTI (urinary tract infection)   • Hypothyroidism   • GERD (gastroesophageal reflux disease)   • Essential hypertension   • Dyslipidemia   • Palpitations   • Malignant melanoma (CMS/HCC)   • Depression   • H/O echocardiogram   • History of Holter monitoring   • History of nuclear stress test   • Guillain Barré syndrome (CMS/HCC)   • Hx of herpes zoster   • History of pseudogout   • History of restless legs syndrome   • History of urinary frequency       Past Medical History:  Past Medical History:   Diagnosis Date   • Arthritis    • Cancer (CMS/HCC)    • Chronic back pain    • Disease of thyroid gland    • GERD (gastroesophageal reflux disease)    • Guillain-[place] disease (CMS/HCC)     when 32 years old   • Hypertension    • Melanoma (CMS/HCC)     left forearm   • Restless leg syndrome    • UTI (urinary tract infection)     • Wears glasses        Past Surgical History:  Past Surgical History:   Procedure Laterality Date   • APPENDECTOMY     • CATARACT EXTRACTION, BILATERAL     • CHOLECYSTECTOMY     • ORTHOPEDIC SURGERY     • REPLACEMENT TOTAL KNEE      Both left and right   • SKIN CANCER EXCISION Left     left medrano   • THYROIDECTOMY, PARTIAL         Social History:  Social History     Socioeconomic History   • Marital status:      Spouse name: Not on file   • Number of children: Not on file   • Years of education: Not on file   • Highest education level: Not on file   Tobacco Use   • Smoking status: Never Smoker   • Smokeless tobacco: Never Used   Substance and Sexual Activity   • Alcohol use: No   • Drug use: No   • Sexual activity: Defer       Allergies:  Allergies   Allergen Reactions   • Cyclobenzaprine Hallucinations     Hallucinations    • Influenza Vaccines Other (See Comments)     Gulliam [place]   • Morphine Nausea And Vomiting   • Nitrofurantoin Nausea And Vomiting     Flu like systems  ( Macrodantin)   • Sulfa Antibiotics GI Intolerance   • Trazodone Nausea And Vomiting   • Estrogens Other (See Comments)     Unknown       • Soma [Carisoprodol] Other (See Comments)     faint         ROS:  Review of Systems   Constitution: Negative for malaise/fatigue.   HENT: Negative for hearing loss and nosebleeds.    Eyes: Negative for double vision and visual disturbance.   Cardiovascular: Positive for dyspnea on exertion. Negative for chest pain, claudication, near-syncope, orthopnea, palpitations, paroxysmal nocturnal dyspnea and syncope.   Respiratory: Negative for cough, shortness of breath, sleep disturbances due to breathing, snoring, sputum production and wheezing.    Endocrine: Negative for cold intolerance, heat intolerance, polydipsia and polyuria.   Hematologic/Lymphatic: Negative for adenopathy and bleeding problem. Does not bruise/bleed easily.   Skin: Negative for flushing and itching.   Musculoskeletal: Negative  "for back pain, falls, joint pain, joint swelling, muscle weakness and neck pain.   Gastrointestinal: Negative for abdominal pain, dysphagia, heartburn, nausea and vomiting.   Genitourinary: Negative for dysuria and frequency.   Neurological: Negative for disturbances in coordination, dizziness, light-headedness, loss of balance, numbness and weakness.   Psychiatric/Behavioral: Negative for altered mental status and memory loss. The patient is not nervous/anxious.    Allergic/Immunologic: Negative for hives and persistent infections.          Objective:         /58   Pulse 60   Ht 160 cm (63\")   Wt 56.7 kg (125 lb)   BMI 22.14 kg/m²     Physical Exam   Constitutional: She is oriented to person, place, and time. She appears well-developed and well-nourished.   HENT:   Head: Normocephalic and atraumatic.   Eyes: Pupils are equal, round, and reactive to light. Conjunctivae and EOM are normal.   Neck: Neck supple. No thyromegaly present.   Cardiovascular: Normal rate, regular rhythm, S1 normal, S2 normal and intact distal pulses. PMI is not displaced. Exam reveals gallop and S4. Exam reveals no S3, no distant heart sounds, no friction rub, no midsystolic click and no opening snap.   No murmur heard.  Pulses:       Carotid pulses are 2+ on the right side, and 2+ on the left side.       Radial pulses are 2+ on the right side, and 2+ on the left side.        Femoral pulses are 2+ on the right side, and 2+ on the left side.       Dorsalis pedis pulses are 2+ on the right side, and 2+ on the left side.        Posterior tibial pulses are 2+ on the right side, and 2+ on the left side.   1/6 systolic murmur left sternal border   Pulmonary/Chest: Effort normal and breath sounds normal. No respiratory distress. She has no wheezes. She has no rales.   Abdominal: Soft. Bowel sounds are normal. She exhibits no distension and no mass. There is no tenderness.   Musculoskeletal: Normal range of motion. She exhibits no edema. "   Neurological: She is alert and oriented to person, place, and time.   Skin: Skin is warm and dry. No erythema.   Psychiatric: She has a normal mood and affect.       In-Office Procedure(s):  Procedures    ASCVD RIsk Score::  The ASCVD Risk score ([place] FREDA Jr., et al., 2013) failed to calculate for the following reasons:    The 2013 ASCVD risk score is only valid for ages 40 to 79        Assessment/Plan:         1. Essential hypertension  Stable    2. Palpitations  Infrequent    3. Dyslipidemia  Continue diet and statin therapy    Overall Ms. Gomez is stable from the cardiovascular standpoint.  She is free of angina euvolemic and relatively active.  I made no changes in her medications.  Her lipids will be monitored by Dr. Gamign.           Jem Munoz MD  01/02/20  .

## 2020-05-19 ENCOUNTER — OFFICE VISIT (OUTPATIENT)
Dept: CARDIOLOGY | Facility: CLINIC | Age: 85
End: 2020-05-19

## 2020-05-19 VITALS
OXYGEN SATURATION: 96 % | RESPIRATION RATE: 18 BRPM | WEIGHT: 125.4 LBS | SYSTOLIC BLOOD PRESSURE: 140 MMHG | DIASTOLIC BLOOD PRESSURE: 72 MMHG | HEART RATE: 62 BPM | HEIGHT: 63 IN | BODY MASS INDEX: 22.22 KG/M2

## 2020-05-19 DIAGNOSIS — R00.2 PALPITATIONS: ICD-10-CM

## 2020-05-19 DIAGNOSIS — R07.9 CHEST PAIN, UNSPECIFIED TYPE: Primary | ICD-10-CM

## 2020-05-19 DIAGNOSIS — E78.5 DYSLIPIDEMIA: ICD-10-CM

## 2020-05-19 DIAGNOSIS — I10 ESSENTIAL HYPERTENSION: ICD-10-CM

## 2020-05-19 PROCEDURE — 93000 ELECTROCARDIOGRAM COMPLETE: CPT | Performed by: INTERNAL MEDICINE

## 2020-05-19 PROCEDURE — 99214 OFFICE O/P EST MOD 30 MIN: CPT | Performed by: INTERNAL MEDICINE

## 2020-05-19 RX ORDER — FLUOROMETHOLONE 0.1 %
1 SUSPENSION, DROPS(FINAL DOSAGE FORM)(ML) OPHTHALMIC (EYE) 2 TIMES DAILY
COMMUNITY
End: 2021-02-11

## 2020-05-20 PROBLEM — I10 ESSENTIAL HYPERTENSION: Status: RESOLVED | Noted: 2019-06-11 | Resolved: 2020-05-20

## 2020-05-20 PROBLEM — R07.9 CHEST PAIN: Status: ACTIVE | Noted: 2020-05-20

## 2020-05-20 NOTE — PROGRESS NOTES
[facility]        Subjective:     Encounter Date:05/19/2020      Patient ID: Rossana Gomez is a 85 y.o. female.      HPI: I saw Rossana Gomez today for cardiovascular care.  She is a pleasant 85-year-old female who reports recent chest discomfort.  Ms. Gomez has been observing the CDC guidelines for social distancing.  She is free of fever cough or increased shortness of breath.  She reports the development of chest discomfort over the last week.  Is located in the mid chest.  She describes it as a squeezing sharp discomfort.  He can last only seconds in duration.  She does not report associated shortness of breath nausea or diaphoresis.  She does have dyspnea on exertion and she feels that it is slightly increased.  She sleeps free of orthopnea or PND no significant lower extremity edema.  She denies syncope near syncope or any significant palpitations.  She has had bilateral total knee replacements and her activity is therefore somewhat limited but stable.  She reports progressive and increasing dyspnea on exertion.    The following portions of the patient's history were reviewed and updated as appropriate: allergies, current medications, past family history, past medical history, past social history, past surgical history and problem list.    Problem List:  Patient Active Problem List   Diagnosis   • UTI (urinary tract infection)   • Hypothyroidism   • GERD (gastroesophageal reflux disease)   • Essential hypertension   • Dyslipidemia   • Palpitations   • Malignant melanoma (CMS/HCC)   • Depression   • H/O echocardiogram   • History of Holter monitoring   • History of nuclear stress test   • Guillain Barré syndrome (CMS/HCC)   • Hx of herpes zoster   • History of pseudogout   • History of restless legs syndrome   • History of urinary frequency   • Chest pain       Past Medical History:  Past Medical History:   Diagnosis Date   • Arthritis    • Cancer (CMS/HCC)    • Chronic back pain    •  Disease of thyroid gland    • Essential hypertension 6/11/2019   • GERD (gastroesophageal reflux disease)    • Guillain-[place] disease (CMS/HCC)     when 32 years old   • Hypertension    • Melanoma (CMS/HCC)     left forearm   • Restless leg syndrome    • UTI (urinary tract infection)    • Wears glasses        Past Surgical History:  Past Surgical History:   Procedure Laterality Date   • APPENDECTOMY     • CATARACT EXTRACTION, BILATERAL     • CHOLECYSTECTOMY     • ORTHOPEDIC SURGERY     • REPLACEMENT TOTAL KNEE      Both left and right   • SKIN CANCER EXCISION Left     left medrano   • THYROIDECTOMY, PARTIAL         Social History:  Social History     Socioeconomic History   • Marital status:      Spouse name: Not on file   • Number of children: Not on file   • Years of education: Not on file   • Highest education level: Not on file   Tobacco Use   • Smoking status: Never Smoker   • Smokeless tobacco: Never Used   Substance and Sexual Activity   • Alcohol use: No   • Drug use: No   • Sexual activity: Defer       Allergies:  Allergies   Allergen Reactions   • Cyclobenzaprine Hallucinations     Hallucinations    • Influenza Vaccines Other (See Comments)     Gulliam [place]   • Morphine Nausea And Vomiting   • Nitrofurantoin Nausea And Vomiting     Flu like systems  ( Macrodantin)   • Sulfa Antibiotics GI Intolerance   • Trazodone Nausea And Vomiting   • Estrogens Other (See Comments)     Unknown       • Soma [Carisoprodol] Other (See Comments)     faint         ROS:  Review of Systems   Constitution: Negative for malaise/fatigue.   HENT: Negative for hearing loss and nosebleeds.    Eyes: Negative for double vision and visual disturbance.   Cardiovascular: Positive for chest pain. Negative for claudication, dyspnea on exertion, near-syncope, orthopnea, palpitations, paroxysmal nocturnal dyspnea and syncope.   Respiratory: Negative for cough, shortness of breath, sleep disturbances due to breathing, snoring, sputum  "production and wheezing.    Endocrine: Negative for cold intolerance, heat intolerance, polydipsia and polyuria.   Hematologic/Lymphatic: Negative for adenopathy and bleeding problem. Does not bruise/bleed easily.   Skin: Negative for flushing and itching.   Musculoskeletal: Negative for back pain, falls, joint pain, joint swelling, muscle weakness and neck pain.   Gastrointestinal: Negative for abdominal pain, dysphagia, heartburn, nausea and vomiting.   Genitourinary: Negative for dysuria and frequency.   Neurological: Negative for disturbances in coordination, dizziness, light-headedness, loss of balance, numbness and weakness.   Psychiatric/Behavioral: Negative for altered mental status and memory loss. The patient is not nervous/anxious.    Allergic/Immunologic: Negative for hives and persistent infections.          Objective:         /72 (BP Location: Left arm, Patient Position: Sitting, Cuff Size: Adult)   Pulse 62   Resp 18   Ht 160 cm (63\")   Wt 56.9 kg (125 lb 6.4 oz)   SpO2 96%   BMI 22.21 kg/m²     Physical Exam   Constitutional: She is oriented to person, place, and time. She appears well-developed and well-nourished.   HENT:   Head: Normocephalic and atraumatic.   Eyes: Pupils are equal, round, and reactive to light. Conjunctivae and EOM are normal.   Neck: Neck supple. No thyromegaly present.   Cardiovascular: Normal rate, regular rhythm, S1 normal, S2 normal and intact distal pulses. PMI is not displaced. Exam reveals gallop and S4. Exam reveals no S3, no distant heart sounds, no friction rub, no midsystolic click and no opening snap.   No murmur heard.  Pulses:       Carotid pulses are 2+ on the right side, and 2+ on the left side.       Radial pulses are 2+ on the right side, and 2+ on the left side.        Femoral pulses are 2+ on the right side, and 2+ on the left side.       Dorsalis pedis pulses are 2+ on the right side, and 2+ on the left side.        Posterior tibial pulses are 2+ " on the right side, and 2+ on the left side.   Pulmonary/Chest: Effort normal and breath sounds normal. No respiratory distress. She has no wheezes. She has no rales.   Abdominal: Soft. Bowel sounds are normal. She exhibits no distension and no mass. There is no tenderness.   Musculoskeletal: Normal range of motion. She exhibits no edema.   Neurological: She is alert and oriented to person, place, and time.   Skin: Skin is warm and dry. No erythema.   Psychiatric: She has a normal mood and affect.       In-Office Procedure(s):    ECG 12 Lead  Date/Time: 5/19/2020 9:33 AM  Performed by: Jem Munoz MD  Authorized by: Jem Munoz MD   Comparison: not compared with previous ECG   Previous ECG: no previous ECG available  Rhythm: sinus rhythm  BPM: 59  Q waves: V1 and V2    QRS axis: left  Other findings: left atrial abnormality    Clinical impression: abnormal EKG  Comments: Cannot exclude prior anteroseptal infarction age undetermined            ASCVD RIsk Score::  The ASCVD Risk score ([place] FREDA Jr., et al., 2013) failed to calculate for the following reasons:    The 2013 ASCVD risk score is only valid for ages 40 to 79        Assessment/Plan:         1. Chest pain, unspecified type  Recurrent been atypical  - Stress Test With Myocardial Perfusion; Future  - Adult Transthoracic Echo Complete W/ Cont if Necessary Per Protocol; Future    2. Palpitations  Rare    3. Dyslipidemia  Low-cholesterol low saturated fat diet and continue pravastatin 40 mg daily    4. Essential hypertension  Target less than 130/80    Ms. Patricia reports recurrent limiting respiratory insufficiency and atypical chest discomfort.  I have counseled her on the importance of continuing to follow her medical regimen restrict her salt intake is close to 2000 mg a day as possible.  We will obtain an echocardiogram to evaluate left ventricular contractility and any evidence of diastolic dysfunction as well as valvular function.  We will proceed  to [facility] noninvasive ischemic assessment.  We will see her in follow-up in 4 weeks sooner if her symptoms progress.       Jem Munoz MD  05/20/20  .

## 2020-06-16 ENCOUNTER — HOSPITAL ENCOUNTER (OUTPATIENT)
Dept: NUCLEAR MEDICINE | Facility: HOSPITAL | Age: 85
Discharge: HOME | End: 2020-06-16

## 2020-06-16 ENCOUNTER — HOSPITAL ENCOUNTER (OUTPATIENT)
Dept: CARDIOLOGY | Facility: HOSPITAL | Age: 85
Discharge: HOME | End: 2020-06-16
Admitting: INTERNAL MEDICINE

## 2020-06-16 VITALS
BODY MASS INDEX: 22.15 KG/M2 | DIASTOLIC BLOOD PRESSURE: 58 MMHG | HEIGHT: 63 IN | WEIGHT: 125 LBS | SYSTOLIC BLOOD PRESSURE: 170 MMHG

## 2020-06-16 DIAGNOSIS — R07.9 CHEST PAIN, UNSPECIFIED TYPE: ICD-10-CM

## 2020-06-16 LAB
BH CV STRESS COMMENTS STAGE 1: NORMAL
BH CV STRESS DOSE REGADENOSON STAGE 1: 0.4
BH CV STRESS DURATION MIN STAGE 1: 0
BH CV STRESS DURATION SEC STAGE 1: 10
BH CV STRESS PROTOCOL 1: NORMAL
BH CV STRESS RECOVERY BP: NORMAL MMHG
BH CV STRESS RECOVERY HR: 69 BPM
BH CV STRESS STAGE 1: 1
MAXIMAL PREDICTED HEART RATE: 137 BPM
PERCENT MAX PREDICTED HR: 61.31 %
STRESS BASELINE BP: NORMAL MMHG
STRESS BASELINE HR: 55 BPM
STRESS PERCENT HR: 72 %
STRESS POST PEAK BP: NORMAL MMHG
STRESS POST PEAK HR: 84 BPM
STRESS TARGET HR: 116 BPM

## 2020-06-16 PROCEDURE — 78452 HT MUSCLE IMAGE SPECT MULT: CPT

## 2020-06-16 PROCEDURE — 93306 TTE W/DOPPLER COMPLETE: CPT

## 2020-06-16 PROCEDURE — 0 TECHNETIUM SESTAMIBI: Performed by: INTERNAL MEDICINE

## 2020-06-16 PROCEDURE — 93017 CV STRESS TEST TRACING ONLY: CPT

## 2020-06-16 PROCEDURE — 25010000002 REGADENOSON 0.4 MG/5ML SOLUTION: Performed by: INTERNAL MEDICINE

## 2020-06-16 PROCEDURE — 93018 CV STRESS TEST I&R ONLY: CPT | Performed by: INTERNAL MEDICINE

## 2020-06-16 PROCEDURE — 93306 TTE W/DOPPLER COMPLETE: CPT | Performed by: INTERNAL MEDICINE

## 2020-06-16 PROCEDURE — A9500 TC99M SESTAMIBI: HCPCS | Performed by: INTERNAL MEDICINE

## 2020-06-16 PROCEDURE — 78452 HT MUSCLE IMAGE SPECT MULT: CPT | Performed by: INTERNAL MEDICINE

## 2020-06-16 RX ORDER — TEMAZEPAM 15 MG/1
1 CAPSULE ORAL AS NEEDED
COMMUNITY
Start: 2020-06-02

## 2020-06-16 RX ADMIN — TECHNETIUM TC 99M SESTAMIBI 1 DOSE: 1 INJECTION INTRAVENOUS at 07:15

## 2020-06-16 RX ADMIN — REGADENOSON 0.4 MG: 0.08 INJECTION, SOLUTION INTRAVENOUS at 08:57

## 2020-06-16 RX ADMIN — TECHNETIUM TC 99M SESTAMIBI 1 DOSE: 1 INJECTION INTRAVENOUS at 08:57

## 2020-06-17 LAB
AORTIC DIMENSIONLESS INDEX: 0.7 (DI)
BH CV ECHO MEAS - ACS: 1.7 CM
BH CV ECHO MEAS - AO MAX PG (FULL): 5 MMHG
BH CV ECHO MEAS - AO MAX PG: 9.7 MMHG
BH CV ECHO MEAS - AO MEAN PG (FULL): 2 MMHG
BH CV ECHO MEAS - AO MEAN PG: 4.3 MMHG
BH CV ECHO MEAS - AO ROOT AREA (BSA CORRECTED): 1.7
BH CV ECHO MEAS - AO ROOT AREA: 6 CM^2
BH CV ECHO MEAS - AO ROOT DIAM: 2.8 CM
BH CV ECHO MEAS - AO V2 MAX: 155.9 CM/SEC
BH CV ECHO MEAS - AO V2 MEAN: 97 CM/SEC
BH CV ECHO MEAS - AO V2 VTI: 38.9 CM
BH CV ECHO MEAS - ASC AORTA: 2.6 CM
BH CV ECHO MEAS - AVA(I,A): 1.7 CM^2
BH CV ECHO MEAS - AVA(I,D): 1.7 CM^2
BH CV ECHO MEAS - AVA(V,A): 1.7 CM^2
BH CV ECHO MEAS - AVA(V,D): 1.7 CM^2
BH CV ECHO MEAS - BSA(HAYCOCK): 1.6 M^2
BH CV ECHO MEAS - BSA: 1.6 M^2
BH CV ECHO MEAS - BZI_BMI: 22.1 KILOGRAMS/M^2
BH CV ECHO MEAS - BZI_METRIC_HEIGHT: 160 CM
BH CV ECHO MEAS - BZI_METRIC_WEIGHT: 56.7 KG
BH CV ECHO MEAS - EDV(CUBED): 70.6 ML
BH CV ECHO MEAS - EDV(MOD-SP2): 56 ML
BH CV ECHO MEAS - EDV(MOD-SP4): 50 ML
BH CV ECHO MEAS - EDV(TEICH): 75.7 ML
BH CV ECHO MEAS - EF(CUBED): 70.5 %
BH CV ECHO MEAS - EF(MOD-BP): 65.2 %
BH CV ECHO MEAS - EF(MOD-SP2): 64.3 %
BH CV ECHO MEAS - EF(MOD-SP4): 66 %
BH CV ECHO MEAS - EF(TEICH): 62.6 %
BH CV ECHO MEAS - ESV(CUBED): 20.8 ML
BH CV ECHO MEAS - ESV(MOD-SP2): 20 ML
BH CV ECHO MEAS - ESV(MOD-SP4): 17 ML
BH CV ECHO MEAS - ESV(TEICH): 28.3 ML
BH CV ECHO MEAS - FS: 33.4 %
BH CV ECHO MEAS - IVS/LVPW: 1.1
BH CV ECHO MEAS - IVSD: 0.71 CM
BH CV ECHO MEAS - LAT PEAK E' VEL: 7.8 CM/SEC
BH CV ECHO MEAS - LV DIASTOLIC VOL/BSA (35-75): 31.6 ML/M^2
BH CV ECHO MEAS - LV MASS(C)D: 80.3 GRAMS
BH CV ECHO MEAS - LV MASS(C)DI: 50.7 GRAMS/M^2
BH CV ECHO MEAS - LV MAX PG: 4.8 MMHG
BH CV ECHO MEAS - LV MEAN PG: 2.3 MMHG
BH CV ECHO MEAS - LV SYSTOLIC VOL/BSA (12-30): 10.7 ML/M^2
BH CV ECHO MEAS - LV V1 MAX: 109.1 CM/SEC
BH CV ECHO MEAS - LV V1 MEAN: 70.7 CM/SEC
BH CV ECHO MEAS - LV V1 VTI: 28 CM
BH CV ECHO MEAS - LVIDD: 4.1 CM
BH CV ECHO MEAS - LVIDS: 2.8 CM
BH CV ECHO MEAS - LVLD AP2: 6.8 CM
BH CV ECHO MEAS - LVLD AP4: 6.6 CM
BH CV ECHO MEAS - LVLS AP2: 5.5 CM
BH CV ECHO MEAS - LVLS AP4: 5.4 CM
BH CV ECHO MEAS - LVOT AREA (M): 2.3 CM^2
BH CV ECHO MEAS - LVOT AREA: 2.4 CM^2
BH CV ECHO MEAS - LVOT DIAM: 1.7 CM
BH CV ECHO MEAS - LVPWD: 0.66 CM
BH CV ECHO MEAS - MED PEAK E' VEL: 6.6 CM/SEC
BH CV ECHO MEAS - MV A DUR: 0.16 SEC
BH CV ECHO MEAS - MV A MAX VEL: 116.4 CM/SEC
BH CV ECHO MEAS - MV DEC SLOPE: 184.2 CM/SEC^2
BH CV ECHO MEAS - MV DEC TIME: 359 SEC
BH CV ECHO MEAS - MV E MAX VEL: 71.3 CM/SEC
BH CV ECHO MEAS - MV E/A: 0.61
BH CV ECHO MEAS - MV MAX PG: 6.9 MMHG
BH CV ECHO MEAS - MV MEAN PG: 1.3 MMHG
BH CV ECHO MEAS - MV P1/2T MAX VEL: 80.4 CM/SEC
BH CV ECHO MEAS - MV P1/2T: 127.7 MSEC
BH CV ECHO MEAS - MV V2 MAX: 131.4 CM/SEC
BH CV ECHO MEAS - MV V2 MEAN: 49 CM/SEC
BH CV ECHO MEAS - MV V2 VTI: 31.3 CM
BH CV ECHO MEAS - MVA P1/2T LCG: 2.7 CM^2
BH CV ECHO MEAS - MVA(P1/2T): 1.7 CM^2
BH CV ECHO MEAS - MVA(VTI): 2.1 CM^2
BH CV ECHO MEAS - PA ACC TIME: 0.13 SEC
BH CV ECHO MEAS - PA MAX PG (FULL): 0.72 MMHG
BH CV ECHO MEAS - PA MAX PG: 2.8 MMHG
BH CV ECHO MEAS - PA PR(ACCEL): 18.4 MMHG
BH CV ECHO MEAS - PA V2 MAX: 83.4 CM/SEC
BH CV ECHO MEAS - PULM A REVS DUR: 0.17 SEC
BH CV ECHO MEAS - PULM A REVS VEL: 31.5 CM/SEC
BH CV ECHO MEAS - PULM DIAS VEL: 36.9 CM/SEC
BH CV ECHO MEAS - PULM S/D: 2.3
BH CV ECHO MEAS - PULM SYS VEL: 85.9 CM/SEC
BH CV ECHO MEAS - PVA(V,A): 2.7 CM^2
BH CV ECHO MEAS - PVA(V,D): 2.7 CM^2
BH CV ECHO MEAS - QP/QS: 0.9
BH CV ECHO MEAS - RAP SYSTOLE: 3 MMHG
BH CV ECHO MEAS - RV BASE (<4.1) - OBSOLETE: 2.9 CM
BH CV ECHO MEAS - RV LENGTH (<8.5) - OBSOLETE: 5.9 CM
BH CV ECHO MEAS - RV MAX PG: 2.1 MMHG
BH CV ECHO MEAS - RV MEAN PG: 1.3 MMHG
BH CV ECHO MEAS - RV V1 MAX: 71.8 CM/SEC
BH CV ECHO MEAS - RV V1 MEAN: 52.9 CM/SEC
BH CV ECHO MEAS - RV V1 VTI: 19 CM
BH CV ECHO MEAS - RVOT AREA: 3.2 CM^2
BH CV ECHO MEAS - RVOT DIAM: 2 CM
BH CV ECHO MEAS - RVSP: 29.3 MMHG
BH CV ECHO MEAS - SI(AO): 147.7 ML/M^2
BH CV ECHO MEAS - SI(CUBED): 31.4 ML/M^2
BH CV ECHO MEAS - SI(LVOT): 42.3 ML/M^2
BH CV ECHO MEAS - SI(MOD-SP2): 22.7 ML/M^2
BH CV ECHO MEAS - SI(MOD-SP4): 20.8 ML/M^2
BH CV ECHO MEAS - SI(TEICH): 29.9 ML/M^2
BH CV ECHO MEAS - SV(AO): 233.9 ML
BH CV ECHO MEAS - SV(CUBED): 49.8 ML
BH CV ECHO MEAS - SV(LVOT): 67 ML
BH CV ECHO MEAS - SV(MOD-SP2): 36 ML
BH CV ECHO MEAS - SV(MOD-SP4): 33 ML
BH CV ECHO MEAS - SV(RVOT): 60.1 ML
BH CV ECHO MEAS - SV(TEICH): 47.4 ML
BH CV ECHO MEAS - TAPSE (>1.6): 2.4 CM2
BH CV ECHO MEAS - TR MAX VEL: 256.3 CM/SEC
BH CV ECHO MEASUREMENTS AVERAGE E/E' RATIO: 9.9
BH CV XLRA - RV BASE: 2.9 CM
BH CV XLRA - RV LENGTH: 5.9 CM
BH CV XLRA - RV MID: 2 CM
BH CV XLRA - TDI S': 11 CM/SEC
LEFT ATRIUM VOLUME INDEX: 28 ML/M2
MAXIMAL PREDICTED HEART RATE: 137 BPM
STRESS TARGET HR: 116 BPM

## 2020-06-26 ENCOUNTER — TELEMEDICINE (OUTPATIENT)
Dept: CARDIOLOGY | Facility: CLINIC | Age: 85
End: 2020-06-26

## 2020-06-26 VITALS
WEIGHT: 121 LBS | DIASTOLIC BLOOD PRESSURE: 75 MMHG | HEIGHT: 63 IN | SYSTOLIC BLOOD PRESSURE: 146 MMHG | HEART RATE: 64 BPM | BODY MASS INDEX: 21.44 KG/M2

## 2020-06-26 DIAGNOSIS — E78.5 DYSLIPIDEMIA: ICD-10-CM

## 2020-06-26 DIAGNOSIS — I10 ESSENTIAL HYPERTENSION: Primary | ICD-10-CM

## 2020-06-26 DIAGNOSIS — R07.9 CHEST PAIN, UNSPECIFIED TYPE: ICD-10-CM

## 2020-06-26 DIAGNOSIS — R00.2 PALPITATIONS: ICD-10-CM

## 2020-06-26 PROCEDURE — 99441 PR PHYS/QHP TELEPHONE EVALUATION 5-10 MIN: CPT | Performed by: INTERNAL MEDICINE

## 2020-06-26 NOTE — PROGRESS NOTES
[facility]        Subjective:     Encounter Date:06/26/2020      Patient ID: Rossana Gomez is a 85 y.o. female.      HPI: Rossana Gomez agreed to a telephone clinical visit today secondary to the coronavirus pandemic.  She continues to observe the [facility] guidelines for social distancing.  She remains free of fever cough or increased shortness of breath.  She does not report any change in her sense of smell or taste.  Ms. Gomez reported some chest discomfort and dyspnea on exertion.  She underwent cardiovascular evaluation including stress nuclear scan on 6/16/2020.  This revealed preserved left ventricular function very small defect in the mid septal wall no other evidence of ischemia or abnormality.  Echocardiogram on the same day revealed again normal left ventricular function grade 1 diastolic dysfunction.  There was mild aortic stenosis with trace aortic insufficiency, mild mitral and tricuspid insufficiency.  Ms. Augustine states that she is free of chest discomfort or any significant dyspnea at present.  She is free of orthopnea or PND no syncope near syncope or palpitation she continues to tolerate her medical regimen well and does not report any significant side effects.      The following portions of the patient's history were reviewed and updated as appropriate: allergies, current medications, past family history, past medical history, past social history, past surgical history and problem list.    Problem List:  Patient Active Problem List   Diagnosis   • UTI (urinary tract infection)   • Hypothyroidism   • GERD (gastroesophageal reflux disease)   • Essential hypertension   • Dyslipidemia   • Palpitations   • Malignant melanoma (CMS/HCC)   • Depression   • H/O echocardiogram   • History of Holter monitoring   • History of nuclear stress test   • Guillain Barré syndrome (CMS/HCC)   • Hx of herpes zoster   • History of pseudogout   • History of restless legs syndrome   • History of  urinary frequency   • Chest pain       Past Medical History:  Past Medical History:   Diagnosis Date   • Arthritis    • Cancer (CMS/HCC)    • Chronic back pain    • Disease of thyroid gland    • Essential hypertension 6/11/2019   • GERD (gastroesophageal reflux disease)    • Guillain-[place] disease (CMS/HCC)     when 32 years old   • Hypertension    • Melanoma (CMS/HCC)     left forearm   • Restless leg syndrome    • UTI (urinary tract infection)    • Wears glasses        Past Surgical History:  Past Surgical History:   Procedure Laterality Date   • APPENDECTOMY     • CATARACT EXTRACTION, BILATERAL     • CHOLECYSTECTOMY     • ORTHOPEDIC SURGERY     • REPLACEMENT TOTAL KNEE      Both left and right   • SKIN CANCER EXCISION Left     left medrano   • THYROIDECTOMY, PARTIAL         Social History:  Social History     Socioeconomic History   • Marital status:      Spouse name: Not on file   • Number of children: Not on file   • Years of education: Not on file   • Highest education level: Not on file   Tobacco Use   • Smoking status: Never Smoker   • Smokeless tobacco: Never Used   Substance and Sexual Activity   • Alcohol use: No   • Drug use: No   • Sexual activity: Defer       Allergies:  Allergies   Allergen Reactions   • Cyclobenzaprine Hallucinations     Hallucinations    • Influenza Vaccines Other (See Comments)     Gulliam [place]   • Morphine Nausea And Vomiting   • Nitrofurantoin Nausea And Vomiting     Flu like systems  ( Macrodantin)   • Sulfa Antibiotics GI Intolerance   • Trazodone Nausea And Vomiting   • Estrogens Other (See Comments)     Unknown       • Soma [Carisoprodol] Other (See Comments)     faint         ROS:  Review of Systems   Constitution: Negative for malaise/fatigue.   HENT: Negative for hearing loss and nosebleeds.    Eyes: Negative for double vision and visual disturbance.   Cardiovascular: Negative for chest pain, claudication, dyspnea on exertion, near-syncope, orthopnea, palpitations,  "paroxysmal nocturnal dyspnea and syncope.   Respiratory: Negative for cough, shortness of breath, sleep disturbances due to breathing, snoring, sputum production and wheezing.    Endocrine: Negative for cold intolerance, heat intolerance, polydipsia and polyuria.   Hematologic/Lymphatic: Negative for adenopathy and bleeding problem. Does not bruise/bleed easily.   Skin: Negative for flushing and itching.   Musculoskeletal: Negative for back pain, falls, joint pain, joint swelling, muscle weakness and neck pain.   Gastrointestinal: Negative for abdominal pain, dysphagia, heartburn, nausea and vomiting.   Genitourinary: Negative for dysuria and frequency.   Neurological: Negative for disturbances in coordination, dizziness, light-headedness, loss of balance, numbness and weakness.   Psychiatric/Behavioral: Negative for altered mental status and memory loss. The patient is not nervous/anxious.    Allergic/Immunologic: Negative for hives and persistent infections.          Objective:         /75   Pulse 64   Ht 160 cm (63\")   Wt 54.9 kg (121 lb)   BMI 21.43 kg/m²     Physical Exam Not performed    In-Office Procedure(s):  Procedures    ASCVD RIsk Score::  The ASCVD Risk score ([place] Jr., et al., 2013) failed to calculate for the following reasons:    The 2013 ASCVD risk score is only valid for ages 40 to 79        Assessment/Plan:         1. Essential hypertension  Target less than 130/80    2. Palpitations  Stable    3. Chest pain, unspecified type  Resolved    4. Dyslipidemia  Continue low-cholesterol low saturated fat diet and pravastatin    Ms. Gomez is stable from a cardiovascular standpoint.  She is in good spirits and does not report any current chest pain pressure or tightness.  She will continue her current medical regimen.  I encouraged her to maintain her activity level restrict the salt in her diet and observe a low-cholesterol low saturated fat diet.     I spent 8 minutes on telephone " conversation and 8 minutes completing electronic medical record documentation       Jem Munoz MD  06/26/20  .

## 2021-01-01 ENCOUNTER — HOSPITAL ENCOUNTER (EMERGENCY)
Facility: HOSPITAL | Age: 86
Discharge: HOME | End: 2021-01-01
Attending: EMERGENCY MEDICINE | Admitting: EMERGENCY MEDICINE

## 2021-01-01 VITALS
HEART RATE: 79 BPM | SYSTOLIC BLOOD PRESSURE: 181 MMHG | RESPIRATION RATE: 18 BRPM | DIASTOLIC BLOOD PRESSURE: 74 MMHG | TEMPERATURE: 98 F | OXYGEN SATURATION: 95 %

## 2021-01-01 DIAGNOSIS — M54.41 ACUTE RIGHT-SIDED LOW BACK PAIN WITH RIGHT-SIDED SCIATICA: Primary | ICD-10-CM

## 2021-01-01 PROCEDURE — 99282 EMERGENCY DEPT VISIT SF MDM: CPT

## 2021-01-01 RX ORDER — METHYLPREDNISOLONE 4 MG/1
TABLET ORAL
Qty: 21 TABLET | Refills: 0 | Status: SHIPPED | OUTPATIENT
Start: 2021-01-01 | End: 2021-02-11

## 2021-01-01 NOTE — ED NOTES
.Pt masked on arrival, RN wearing mask/goggles during encounter       Kenny Stewart, RN  01/01/21 [id number]

## 2021-01-01 NOTE — ED TRIAGE NOTES
Pt to ER via [facility]. Pt c/o right lower back and hip pain that started x1 week ago. Pt states pain goes down into her right leg    Patient in mask. This RN in appropriate PPE - including mask, goggles, and gloves during all of patient care.

## 2021-01-01 NOTE — ED PROVIDER NOTES
EMERGENCY DEPARTMENT ENCOUNTER    Room Number:  22/22  Date of encounter:  1/1/2021  PCP: Erin Gaming MD  Historian: Patient      HPI:  Chief Complaint: Back pain  A complete HPI/ROS/PMH/PSH/SH/FH are unobtainable due to: None    Context: Rossana Gomez is a 85 y.o. female who presents to the ED c/o right low back pain.  Onset 1 week ago.  Symptoms are constant.  Worse with movement.  It is moderate pain that feels very similar to prior episodes of sciatica.  She cannot think of anything that triggered it today.    Patient denies any recent trauma to back. No fevers. No history of illicit IV drug use. No chronic steroid use. No anticoagulants.  History of melanoma but patient is considered cured. No saddle anesthesia or bowel or bladder changes.      PAST MEDICAL HISTORY  Active Ambulatory Problems     Diagnosis Date Noted   • UTI (urinary tract infection) 10/26/2017   • Hypothyroidism 06/13/2018   • GERD (gastroesophageal reflux disease) 06/11/2019   • Essential hypertension 06/11/2019   • Dyslipidemia 06/11/2019   • Palpitations 06/11/2019   • Malignant melanoma (CMS/HCC) 06/11/2019   • Depression 06/11/2019   • H/O echocardiogram 06/11/2019   • History of Holter monitoring 06/11/2019   • History of nuclear stress test 06/11/2019   • Guillain Barré syndrome (CMS/HCC) 06/11/2019   • Hx of herpes zoster 06/11/2019   • History of pseudogout 06/11/2019   • History of restless legs syndrome 06/11/2019   • History of urinary frequency 06/11/2019   • Chest pain 05/20/2020     Resolved Ambulatory Problems     Diagnosis Date Noted   • No Resolved Ambulatory Problems     Past Medical History:   Diagnosis Date   • Arthritis    • Cancer (CMS/HCC)    • Chronic back pain    • Disease of thyroid gland    • Guillain-Poteet disease (CMS/HCC)    • Hypertension    • Melanoma (CMS/HCC)    • Restless leg syndrome    • Wears glasses          PAST SURGICAL HISTORY  Past Surgical History:   Procedure Laterality Date   •  APPENDECTOMY     • CATARACT EXTRACTION, BILATERAL     • CHOLECYSTECTOMY     • ORTHOPEDIC SURGERY     • REPLACEMENT TOTAL KNEE      Both left and right   • SKIN CANCER EXCISION Left     left medrano   • THYROIDECTOMY, PARTIAL           FAMILY HISTORY  Family History   Problem Relation Age of Onset   • Hypertension Mother    • Heart attack Father    • Heart disease Father    • Diabetes Brother    • Cancer Brother          SOCIAL HISTORY  Social History     Socioeconomic History   • Marital status:      Spouse name: Not on file   • Number of children: Not on file   • Years of education: Not on file   • Highest education level: Not on file   Tobacco Use   • Smoking status: Never Smoker   • Smokeless tobacco: Never Used   Substance and Sexual Activity   • Alcohol use: No   • Drug use: No   • Sexual activity: Defer         ALLERGIES  Cyclobenzaprine, Influenza vaccines, Morphine, Nitrofurantoin, Sulfa antibiotics, Trazodone, Estrogens, and Soma [carisoprodol]        REVIEW OF SYSTEMS  Review of Systems     All systems reviewed and negative except for those discussed in HPI.       PHYSICAL EXAM    I have reviewed the triage vital signs and nursing notes.    ED Triage Vitals   Temp Heart Rate Resp BP SpO2   -- 01/01/21 1111 01/01/21 1111 01/01/21 1127 01/01/21 1111    79 18 (!) 181/74 95 %      Temp src Heart Rate Source Patient Position BP Location FiO2 (%)   -- -- -- -- --              Physical Exam  GENERAL: not distressed  HENT: nares patent  EYES: no scleral icterus  CV: regular rhythm, regular rate  RESPIRATORY: normal effort  ABDOMEN: soft, nontender, no pulsatile abdominal masses  MUSCULOSKELETAL: no deformity, no T or L-spine tenderness  NEURO: alert, moves all extremities, follows commands  5/5 strength to hip flexion, knee extension/flexion, dorsiflexion, plantarflexion, and EHL  1+ patellar reflexes bilaterally  SILT at bilateral superficial peroneal, deep peroneal, sural, and saphenous nerves  Positive  straight leg raise test on the right  SKIN: warm, dry        LAB RESULTS  No results found for this or any previous visit (from the past 24 hour(s)).    Ordered the above labs and independently reviewed the results.        RADIOLOGY  No Radiology Exams Resulted Within Past 24 Hours    I ordered the above noted radiological studies. Reviewed by me and discussed with radiologist.  See dictation for official radiology interpretation.      PROCEDURES    Procedures      MEDICATIONS GIVEN IN ER    Medications - No data to display      PROGRESS, DATA ANALYSIS, CONSULTS, AND MEDICAL DECISION MAKING    All labs have been independently reviewed by me.  All radiology studies have been reviewed by me and discussed with radiologist dictating the report.   EKG's independently viewed and interpreted by me.  Discussion below represents my analysis of pertinent findings related to patient's condition, differential diagnosis, treatment plan and final disposition.    Patient presents with exam consistent with sciatica.  This feels similar to her prior to the sciatica.  No red flags.  I have low suspicion for cauda equina syndrome or central cord compression syndrome.         I do not believe the x-rays are of much diagnostic utility at this point.  Patient is in agreement.  We will treat her with symptomatic support with steroids as well as cryotherapy.    PPE: Both the patient and I wore a surgical mask throughout the entire patient encounter. I wore protective goggles.     AS OF 11:35 EST VITALS:    BP - (!) 181/74  HR - 79  TEMP -    O2 SATS - 95%        DIAGNOSIS  Final diagnoses:   Acute right-sided low back pain with right-sided sciatica         DISPOSITION  DISCHARGE    FOLLOW-UP  [facility] Emergency Department  [address] [facility] [address] [phone]  Go to   If symptoms worsen    Keagan Bills MD  [address] [id number] [phone]    Schedule an appointment as  soon as possible for a visit   As needed         Medication List      New Prescriptions    methylPREDNISolone 4 MG dose pack  Commonly known as: MEDROL  Take as directed on package instructions.           Where to Get Your Medications      These medications were sent to [id number] - [place], [place] - [address] AT [id number] - [phone] PH - [phone] FX  [address], [facility] [address]    Phone: [phone]   · methylPREDNISolone 4 MG dose pack                  Phan Dennison II, MD  01/01/21 1735

## 2021-01-05 ENCOUNTER — TELEPHONE (OUTPATIENT)
Dept: NEUROSURGERY | Facility: CLINIC | Age: 86
End: 2021-01-05

## 2021-01-05 NOTE — TELEPHONE ENCOUNTER
I l/m for pt to call back. She is scheduled to see Dr. Bills in 2/2021for her back. She was seen by Dr. Cevallos in 11/2020 and he mentioned in his note that pt isn't interested in surgery.    We need to ask if she is adamant about not having surgery because if so she will not need an appt with Dr. Bills.

## 2021-01-11 ENCOUNTER — TELEPHONE (OUTPATIENT)
Dept: NEUROSURGERY | Facility: CLINIC | Age: 86
End: 2021-01-11

## 2021-02-03 ENCOUNTER — TELEPHONE (OUTPATIENT)
Dept: NEUROSURGERY | Facility: CLINIC | Age: 86
End: 2021-02-03

## 2021-02-03 NOTE — PROGRESS NOTES
"Subjective   Patient ID: Rossana Gomez is a 85 y.o. female is here today as a self referral for spinal stenosis. Patient had MRI Lumbar at [facility].    Treatment: no recent treatment    Today patient is having low back that radiates to the R leg. Patient has intermittent N/T in the R leg/foot.     Patient, Provider, and MA are all wearing a mask today in our office.    History of Present Illness     This patient has been having severe pain in her back with radiation primarily into her right leg for some years.  It has been getting steadily worse.  It is located in her right buttock and goes into her right posterior lateral thigh and posterior lateral calf.  She has been treated by Dr. Barakat with injections and some medications but has not gotten any long-term relief.  The left leg is okay and she has no difficulty with bowel bladder control or other associated symptoms.  Activity makes the pain worse especially standing.    The following portions of the patient's history were reviewed and updated as appropriate: allergies, current medications, past family history, past medical history, past social history, past surgical history and problem list.    Review of Systems   Constitutional: Negative for chills and fever.   HENT: Negative for congestion.    Genitourinary: Positive for difficulty urinating. Negative for dysuria.   Musculoskeletal: Positive for back pain. Negative for gait problem, neck pain and neck stiffness.   Neurological: Positive for weakness and numbness.       I have reviewed the review of systems as documented by my MA.      Objective     Vitals:    02/04/21 1404   BP: 153/69   Cuff Size: Adult   Pulse: 65   Temp: 98 °F (36.7 °C)   Weight: 54.9 kg (121 lb)   Height: 160 cm (63\")     Body mass index is 21.43 kg/m².      Physical Exam  Constitutional:       Appearance: She is well-developed.   HENT:      Head: Normocephalic and atraumatic.   Eyes:      Extraocular Movements: EOM normal.      " Conjunctiva/sclera: Conjunctivae normal.      Pupils: Pupils are equal, round, and reactive to light.   Neck:      Vascular: No carotid bruit.   Neurological:      Mental Status: She is oriented to person, place, and time.      Coordination: Finger-Nose-Finger Test and Heel to Shin Test normal.      Gait: Gait is intact.      Deep Tendon Reflexes:      Reflex Scores:       Tricep reflexes are 2+ on the right side and 2+ on the left side.       Bicep reflexes are 2+ on the right side and 2+ on the left side.       Brachioradialis reflexes are 2+ on the right side and 2+ on the left side.       Patellar reflexes are 2+ on the right side and 2+ on the left side.       Achilles reflexes are 2+ on the right side and 2+ on the left side.  Psychiatric:         Speech: Speech normal.       Neurologic Exam     Mental Status   Oriented to person, place, and time.   Registration of memory: Good recent and remote memory.   Attention: normal. Concentration: normal.   Speech: speech is normal   Level of consciousness: alert  Knowledge: consistent with education.     Cranial Nerves     CN II   Visual fields full to confrontation.   Visual acuity: normal    CN III, IV, VI   Pupils are equal, round, and reactive to light.  Extraocular motions are normal.     CN V   Facial sensation intact.   Right corneal reflex: normal  Left corneal reflex: normal    CN VII   Facial expression full, symmetric.   Right facial weakness: none  Left facial weakness: none    CN VIII   Hearing: intact    CN IX, X   Palate: symmetric    CN XI   Right sternocleidomastoid strength: normal  Left sternocleidomastoid strength: normal    CN XII   Tongue: not atrophic  Tongue deviation: none    Motor Exam   Muscle bulk: normal  Right arm tone: normal  Left arm tone: normal  Right leg tone: normal  Left leg tone: normal    Strength   Strength 5/5 except as noted.     Sensory Exam   Light touch normal.     Gait, Coordination, and Reflexes     Gait  Gait:  normal    Coordination   Finger to nose coordination: normal  Heel to shin coordination: normal    Reflexes   Right brachioradialis: 2+  Left brachioradialis: 2+  Right biceps: 2+  Left biceps: 2+  Right triceps: 2+  Left triceps: 2+  Right patellar: 2+  Left patellar: 2+  Right achilles: 2+  Left achilles: 2+  Right : 2+  Left : 2+          Assessment/Plan   Independent Review of Radiographic Studies:      I personally reviewed the images from the following studies.    I reviewed an MRI of the lumbar spine done in August of last year.  This does show some stenosis at L3-4 and L4-5.  L2-3 is fairly open as is L1 to and L5-S1.    Medical Decision Making:      I told the patient and her son about the imaging.  I told her that we could certainly consider surgery at this point as she is used up all of her other options.  I recommended we do a lumbar myelogram to get a better image of her lumbar spine and decide what we would need to do so she can decide if she wants to take it on.  I told the patient what a myelogram involves.  I explained that there is a 50% chance of developing a bad headache and nausea as a result of the test.  I explained that there is also a very small chance of infection, seizures, and bleeding.  I explained how we would treat a post myelogram headache including bedrest, caffeinated fluids, steroids, and blood patch.  The patient does ask to proceed.    Diagnoses and all orders for this visit:    1. Spinal stenosis of lumbar region with neurogenic claudication (Primary)  -     Obtain Informed Consent; Standing  -     IR Myelogram Lumbar Spine; Future  -     CT Lumbar Spine With Intrathecal Contrast; Future  -     XR Spine Lumbar Complete With Flex & Ext; Future  -     No Lab Testing Needed; Standing  -     dexamethasone (DECADRON) 4 MG tablet; Take 2 tablets by mouth Take As Directed. Take both tablets by mouth 2 hours before myelogram  Dispense: 2 tablet; Refill: 0      Return for After  radiology test.

## 2021-02-03 NOTE — H&P (VIEW-ONLY)
"Subjective   Patient ID: Rossana Gomez is a 85 y.o. female is here today as a self referral for spinal stenosis. Patient had MRI Lumbar at [facility].    Treatment: no recent treatment    Today patient is having low back that radiates to the R leg. Patient has intermittent N/T in the R leg/foot.     Patient, Provider, and MA are all wearing a mask today in our office.    History of Present Illness     This patient has been having severe pain in her back with radiation primarily into her right leg for some years.  It has been getting steadily worse.  It is located in her right buttock and goes into her right posterior lateral thigh and posterior lateral calf.  She has been treated by Dr. Barakat with injections and some medications but has not gotten any long-term relief.  The left leg is okay and she has no difficulty with bowel bladder control or other associated symptoms.  Activity makes the pain worse especially standing.    The following portions of the patient's history were reviewed and updated as appropriate: allergies, current medications, past family history, past medical history, past social history, past surgical history and problem list.    Review of Systems   Constitutional: Negative for chills and fever.   HENT: Negative for congestion.    Genitourinary: Positive for difficulty urinating. Negative for dysuria.   Musculoskeletal: Positive for back pain. Negative for gait problem, neck pain and neck stiffness.   Neurological: Positive for weakness and numbness.       I have reviewed the review of systems as documented by my MA.      Objective     Vitals:    02/04/21 1404   BP: 153/69   Cuff Size: Adult   Pulse: 65   Temp: 98 °F (36.7 °C)   Weight: 54.9 kg (121 lb)   Height: 160 cm (63\")     Body mass index is 21.43 kg/m².      Physical Exam  Constitutional:       Appearance: She is well-developed.   HENT:      Head: Normocephalic and atraumatic.   Eyes:      Extraocular Movements: EOM normal.      " Conjunctiva/sclera: Conjunctivae normal.      Pupils: Pupils are equal, round, and reactive to light.   Neck:      Vascular: No carotid bruit.   Neurological:      Mental Status: She is oriented to person, place, and time.      Coordination: Finger-Nose-Finger Test and Heel to Shin Test normal.      Gait: Gait is intact.      Deep Tendon Reflexes:      Reflex Scores:       Tricep reflexes are 2+ on the right side and 2+ on the left side.       Bicep reflexes are 2+ on the right side and 2+ on the left side.       Brachioradialis reflexes are 2+ on the right side and 2+ on the left side.       Patellar reflexes are 2+ on the right side and 2+ on the left side.       Achilles reflexes are 2+ on the right side and 2+ on the left side.  Psychiatric:         Speech: Speech normal.       Neurologic Exam     Mental Status   Oriented to person, place, and time.   Registration of memory: Good recent and remote memory.   Attention: normal. Concentration: normal.   Speech: speech is normal   Level of consciousness: alert  Knowledge: consistent with education.     Cranial Nerves     CN II   Visual fields full to confrontation.   Visual acuity: normal    CN III, IV, VI   Pupils are equal, round, and reactive to light.  Extraocular motions are normal.     CN V   Facial sensation intact.   Right corneal reflex: normal  Left corneal reflex: normal    CN VII   Facial expression full, symmetric.   Right facial weakness: none  Left facial weakness: none    CN VIII   Hearing: intact    CN IX, X   Palate: symmetric    CN XI   Right sternocleidomastoid strength: normal  Left sternocleidomastoid strength: normal    CN XII   Tongue: not atrophic  Tongue deviation: none    Motor Exam   Muscle bulk: normal  Right arm tone: normal  Left arm tone: normal  Right leg tone: normal  Left leg tone: normal    Strength   Strength 5/5 except as noted.     Sensory Exam   Light touch normal.     Gait, Coordination, and Reflexes     Gait  Gait:  normal    Coordination   Finger to nose coordination: normal  Heel to shin coordination: normal    Reflexes   Right brachioradialis: 2+  Left brachioradialis: 2+  Right biceps: 2+  Left biceps: 2+  Right triceps: 2+  Left triceps: 2+  Right patellar: 2+  Left patellar: 2+  Right achilles: 2+  Left achilles: 2+  Right : 2+  Left : 2+          Assessment/Plan   Independent Review of Radiographic Studies:      I personally reviewed the images from the following studies.    I reviewed an MRI of the lumbar spine done in August of last year.  This does show some stenosis at L3-4 and L4-5.  L2-3 is fairly open as is L1 to and L5-S1.    Medical Decision Making:      I told the patient and her son about the imaging.  I told her that we could certainly consider surgery at this point as she is used up all of her other options.  I recommended we do a lumbar myelogram to get a better image of her lumbar spine and decide what we would need to do so she can decide if she wants to take it on.  I told the patient what a myelogram involves.  I explained that there is a 50% chance of developing a bad headache and nausea as a result of the test.  I explained that there is also a very small chance of infection, seizures, and bleeding.  I explained how we would treat a post myelogram headache including bedrest, caffeinated fluids, steroids, and blood patch.  The patient does ask to proceed.    Diagnoses and all orders for this visit:    1. Spinal stenosis of lumbar region with neurogenic claudication (Primary)  -     Obtain Informed Consent; Standing  -     IR Myelogram Lumbar Spine; Future  -     CT Lumbar Spine With Intrathecal Contrast; Future  -     XR Spine Lumbar Complete With Flex & Ext; Future  -     No Lab Testing Needed; Standing  -     dexamethasone (DECADRON) 4 MG tablet; Take 2 tablets by mouth Take As Directed. Take both tablets by mouth 2 hours before myelogram  Dispense: 2 tablet; Refill: 0      Return for After  radiology test.

## 2021-02-04 ENCOUNTER — OFFICE VISIT (OUTPATIENT)
Dept: NEUROSURGERY | Facility: CLINIC | Age: 86
End: 2021-02-04

## 2021-02-04 VITALS
HEART RATE: 65 BPM | DIASTOLIC BLOOD PRESSURE: 69 MMHG | TEMPERATURE: 98 F | WEIGHT: 121 LBS | HEIGHT: 63 IN | SYSTOLIC BLOOD PRESSURE: 153 MMHG | BODY MASS INDEX: 21.44 KG/M2

## 2021-02-04 DIAGNOSIS — M48.062 SPINAL STENOSIS OF LUMBAR REGION WITH NEUROGENIC CLAUDICATION: Primary | ICD-10-CM

## 2021-02-04 PROCEDURE — 99204 OFFICE O/P NEW MOD 45 MIN: CPT | Performed by: NEUROLOGICAL SURGERY

## 2021-02-04 RX ORDER — NAPROXEN 500 MG/1
TABLET ORAL
COMMUNITY
Start: 2020-11-14 | End: 2021-05-13

## 2021-02-04 RX ORDER — DEXAMETHASONE 4 MG/1
8 TABLET ORAL TAKE AS DIRECTED
Qty: 2 TABLET | Refills: 0 | Status: SHIPPED | OUTPATIENT
Start: 2021-02-04 | End: 2021-02-11

## 2021-02-10 PROBLEM — E78.5 DYSLIPIDEMIA: Chronic | Status: ACTIVE | Noted: 2019-06-11

## 2021-02-10 PROBLEM — R00.2 PALPITATIONS: Chronic | Status: ACTIVE | Noted: 2019-06-11

## 2021-02-10 PROBLEM — I10 ESSENTIAL HYPERTENSION: Chronic | Status: ACTIVE | Noted: 2019-06-11

## 2021-02-11 ENCOUNTER — TELEMEDICINE (OUTPATIENT)
Dept: CARDIOLOGY | Facility: CLINIC | Age: 86
End: 2021-02-11

## 2021-02-11 VITALS
SYSTOLIC BLOOD PRESSURE: 140 MMHG | WEIGHT: 117 LBS | HEIGHT: 63 IN | BODY MASS INDEX: 20.73 KG/M2 | HEART RATE: 74 BPM | DIASTOLIC BLOOD PRESSURE: 67 MMHG

## 2021-02-11 DIAGNOSIS — E78.5 DYSLIPIDEMIA: Chronic | ICD-10-CM

## 2021-02-11 DIAGNOSIS — I35.0 AORTIC STENOSIS, MILD: Chronic | ICD-10-CM

## 2021-02-11 DIAGNOSIS — R00.2 PALPITATIONS: Primary | Chronic | ICD-10-CM

## 2021-02-11 DIAGNOSIS — I51.89 GRADE I DIASTOLIC DYSFUNCTION: Chronic | ICD-10-CM

## 2021-02-11 DIAGNOSIS — I35.1 TRACE AORTIC VALVE REGURGITATION: Chronic | ICD-10-CM

## 2021-02-11 DIAGNOSIS — I10 ESSENTIAL HYPERTENSION: Chronic | ICD-10-CM

## 2021-02-11 PROCEDURE — 99443 PR PHYS/QHP TELEPHONE EVALUATION 21-30 MIN: CPT | Performed by: INTERNAL MEDICINE

## 2021-02-11 RX ORDER — LOSARTAN POTASSIUM 25 MG/1
25 TABLET ORAL DAILY
Qty: 90 TABLET | Refills: 3 | Status: SHIPPED | OUTPATIENT
Start: 2021-02-11 | End: 2021-11-29

## 2021-02-11 NOTE — PROGRESS NOTES
"Chief Complaint  Hypertension    Subjective    History of Present Illness      Rossana Gomez presents to [facility] for telephone clinical visit secondary to the coronavirus pandemic.  She continues to observe the CDC guidelines.  Noemí is a very sweet 85-year-old female who reports some low back and hip discomfort.  She is undergoing evaluation and treatment.  She denies chest pain pressure or tightness.  She is free of any progressive dyspnea on exertion and does not experience orthopnea PND or lower extremity edema.  She denies syncope near syncope or palpitation.  Review of her echocardiogram from 6/16/2020 demonstrates preserved left ventricular function, grade 1 diastolic dysfunction, mild aortic stenosis and trace aortic insufficiency.  Nuclear stress test from 6/16/2020 also revealed preserved left ventricular function no significant ischemia.  She monitors her blood pressure at home and reports is primarily above 140/80.    Objective   Vital Signs:   /67   Pulse 74   Ht 160 cm (63\")   Wt 53.1 kg (117 lb)   BMI 20.73 kg/m²     Physical Exam not performed  Result Review :   The following data was reviewed by: Jem Munoz MD on 02/11/2021:      Data reviewed: Cardiology studies Echocardiogram 6/16/2020, nuclear stress test 6/16/2020          Assessment and Plan    1. Essential hypertension  Target less than 130/80    2. Grade I diastolic dysfunction  Compensated    3. Dyslipidemia  Continue low-cholesterol low saturated fat diet and pravastatin 40 mg daily    4. Palpitations  Stable    5. Aortic stenosis, mild  Compensated    6. Trace aortic valve regurgitation  Compensated    Ms. Gomez is free of angina and denies any significant respiratory insufficiency.  I have encouraged her to restrict her salt intake is close to 2000 mg a day as possible.  I will advance her losartan to 75 mg daily for further blood pressure control.  Should she " develop any orthostatic dizziness she will contact me.  I will otherwise see her in follow-up in 3 months.      I spent 22 minutes caring for Rossana on this date of service. Follow Up   No follow-ups on file.  Patient was given instructions and counseling regarding her condition or for health maintenance advice. Please see specific information pulled into the AVS if appropriate.

## 2021-02-13 NOTE — PROGRESS NOTES
02/23/21 0000   Pre-Procedure Phone Call   Procedure Time Verified Yes   Arrival Time 0600   Procedure Location Verified Yes   Medical History Reviewed No   NPO Status Reinforced Yes   Ride and Caregiver Arranged Yes   Patient Knows to Bring Current Medications No   Bring Outside Films Requested No

## 2021-02-23 ENCOUNTER — HOSPITAL ENCOUNTER (OUTPATIENT)
Dept: GENERAL RADIOLOGY | Facility: HOSPITAL | Age: 86
Discharge: HOME | End: 2021-02-23

## 2021-02-23 ENCOUNTER — HOSPITAL ENCOUNTER (OUTPATIENT)
Dept: CT IMAGING | Facility: HOSPITAL | Age: 86
Discharge: HOME | End: 2021-02-23

## 2021-02-23 VITALS
WEIGHT: 116 LBS | SYSTOLIC BLOOD PRESSURE: 156 MMHG | OXYGEN SATURATION: 97 % | TEMPERATURE: 97.7 F | RESPIRATION RATE: 18 BRPM | BODY MASS INDEX: 19.81 KG/M2 | DIASTOLIC BLOOD PRESSURE: 68 MMHG | HEART RATE: 91 BPM | HEIGHT: 64 IN

## 2021-02-23 DIAGNOSIS — M48.062 SPINAL STENOSIS OF LUMBAR REGION WITH NEUROGENIC CLAUDICATION: ICD-10-CM

## 2021-02-23 PROCEDURE — 72240 MYELOGRAPHY NECK SPINE: CPT

## 2021-02-23 PROCEDURE — 63710000001 HYDROCODONE-ACETAMINOPHEN 5-325 MG TABLET: Performed by: NEUROLOGICAL SURGERY

## 2021-02-23 PROCEDURE — 0 IOPAMIDOL 41 % SOLUTION: Performed by: NEUROLOGICAL SURGERY

## 2021-02-23 PROCEDURE — 25010000003 LIDOCAINE 1 % SOLUTION: Performed by: NEUROLOGICAL SURGERY

## 2021-02-23 PROCEDURE — 72132 CT LUMBAR SPINE W/DYE: CPT

## 2021-02-23 PROCEDURE — 62284 INJECTION FOR MYELOGRAM: CPT | Performed by: NEUROLOGICAL SURGERY

## 2021-02-23 PROCEDURE — 62304 MYELOGRAPHY LUMBAR INJECTION: CPT

## 2021-02-23 PROCEDURE — A9270 NON-COVERED ITEM OR SERVICE: HCPCS | Performed by: NEUROLOGICAL SURGERY

## 2021-02-23 PROCEDURE — 72114 X-RAY EXAM L-S SPINE BENDING: CPT

## 2021-02-23 RX ORDER — LIDOCAINE HYDROCHLORIDE 10 MG/ML
10 INJECTION, SOLUTION INFILTRATION; PERINEURAL ONCE
Status: COMPLETED | OUTPATIENT
Start: 2021-02-23 | End: 2021-02-23

## 2021-02-23 RX ORDER — HYDROCODONE BITARTRATE AND ACETAMINOPHEN 5; 325 MG/1; MG/1
1 TABLET ORAL EVERY 4 HOURS PRN
Status: DISCONTINUED | OUTPATIENT
Start: 2021-02-23 | End: 2021-02-24 | Stop reason: HOSPADM

## 2021-02-23 RX ORDER — ACETAMINOPHEN 325 MG/1
650 TABLET ORAL EVERY 4 HOURS PRN
Status: DISCONTINUED | OUTPATIENT
Start: 2021-02-23 | End: 2021-02-24 | Stop reason: HOSPADM

## 2021-02-23 RX ADMIN — HYDROCODONE BITARTRATE AND ACETAMINOPHEN 1 TABLET: 5; 325 TABLET ORAL at 07:40

## 2021-02-23 RX ADMIN — LIDOCAINE HYDROCHLORIDE 6 ML: 10 INJECTION, SOLUTION INFILTRATION; PERINEURAL at 07:03

## 2021-02-23 RX ADMIN — IOPAMIDOL 20 ML: 408 INJECTION, SOLUTION INTRATHECAL at 07:07

## 2021-02-23 NOTE — DISCHARGE INSTRUCTIONS
EDUCATION /DISCHARGE INSTRUCTIONS:    A myelogram is a special radiology procedure of the spinal cord, spinal nerves and other related structures.  You will be awake during the examination.  An area of your lower back will be cleansed with an antiseptic solution.  The physician will inject a numbing medication in your lower back.  While your back is numb, a needle will be placed in the lower back area.  A small amount of spinal fluid may be withdrawn and sent to the lab if ordered by your physician. While the needle is in the back, an injection of a contrast material (xray dye) will be given through the needle.  The contrast material will allow the physician to see the spinal cord and spinal nerves.  Once injected, the needle will be removed and a band aid will be placed over the injection site.  The table will be tilted during the process to allow the contrast material to flow to particular areas in the spine.  Following the injection and xrays, you will be taken to the CT scan where more pictures will be taken. After the procedure is finished, the contrast material will be absorbed by your body and eliminated through your kidneys.  The radiologist will study and interpret your myelogram and send the results to your physician.  Procedure risks of a myelogram include, but are not limited to:  *  Bleeding   *  seizure  *  Infection   *  Headache, possibly severe requiring  *  Contrast reaction      a blood patch  *  Nerve or cord injury  *  Paralysis and death  Benefits of the procedure:  *  Best examination for delineating pathology related to spinal cord compression from a    disc and/or nerve root compression  Alternatives to the procedure:  MRI - a non invasive procedure requiring intravenous contrast injection.  Cannot be done on patients with certain pacemakers or metal in the body.  MRI risks include possible reaction to the contrast material, movement of metal located in the body.Benefit to MRI:  Non-invasive  and usually painless procedure.  THIS EDUCATION INFORMATION WAS REVIEWED PRIOR TO PROCEDURE AND CONSENT. Patient initials____VC____Time___0642____    24 hour rest period ends tomorrow, Feb 24th after 1000 am.  Important information following your myelogram:  * ACTIVITY:   *  Lie down with your head elevated no more than 2 pillows high today & tonight  *  Sit up to eat your meals and use the restroom, otherwise, lie down.  *  Remain less active for two to three days.  *  Do not drive for 48 hours following a myelogram  *  You may remove the bandage and shower in the morning  *  Increase your fluids for the next 24 hours.  Caffeinated drinks are encouraged.  Resume taking aspirin today    CALL YOUR PHYSICIAN FOR THE FOLLOWING:  * Pain at the injection site  * Reddness, swelling, bruising or drainage at the injection site.  * A fever by mouth of 101.0 or any new symptoms  Headaches are a common side effect after a myelogram.  If you get a headache, you should stay flat in bed and drink plenty of fluids. If the headache persist and does not go away with rest/medication, CALL Dr. Bills at [phone]

## 2021-02-23 NOTE — NURSING NOTE
Patient taken by wheelchair to Patient Discharge to meet her son.      Patient was wearing face mask throughout encounter. I wore mask and goggles throughout the encounter. Hand hygiene was performed before and after patient encounter.

## 2021-02-23 NOTE — NURSING NOTE
Bette Corrales MD,    Your patient is over due for her annual low dose lung screening. If you would like her to continue in the program, please order: [id number]  \"CT Lung Cancer Screening Low Dose WO Contrast\"     To avoid coverage denial use both: an appropriate ICD-10 nicotine dependence code. Such as: ICD-10 Z87.891 (Primary) (personal history of tobacco use/nicotine dependence)  and ICD-10 Z12.2 (Secondary) (screening for malignant neoplasm, respiratory organs). Using both codes allows patients to receive the covered screening benefit.      Thank you for using [facility], feel free to contact me with any questions.    Katerin Del Rosario RN, BSN  Lung Screening           Pt arrived to Radiology triage [place] [facility] for :Lumbar Myelogram.   Pt wearing a mask as well as this RN for any bedside care.

## 2021-02-24 ENCOUNTER — TELEPHONE (OUTPATIENT)
Dept: INTERVENTIONAL RADIOLOGY/VASCULAR | Facility: HOSPITAL | Age: 86
End: 2021-02-24

## 2021-03-01 NOTE — PROGRESS NOTES
"Subjective   Patient ID: Rossana Gomez is a 85 y.o. female is here today for follow-up with a new Lumbar Myelogram that was ordered 02.04.2021 for back and leg pain.    Today patient's symptoms have worsened. Patient Is now having pain in bilateral lower extremities.     Patient, Provider, and MA are all wearing a mask in our office today.     History of Present Illness     This patient returns today.  She continues with severe pain in her back with radiation into her right leg.    The following portions of the patient's history were reviewed and updated as appropriate: allergies, current medications, past family history, past medical history, past social history, past surgical history and problem list.    Review of Systems   Constitutional: Negative for chills and fever.   HENT: Negative for congestion.    Genitourinary: Negative for difficulty urinating and dysuria.   Musculoskeletal: Positive for back pain and gait problem. Negative for neck pain and neck stiffness.   Neurological: Positive for weakness and numbness.       I have reviewed the review of systems as documented by my MA.      Objective     Vitals:    03/04/21 1043   BP: 140/61   Cuff Size: Adult   Pulse: 78   Temp: 98 °F (36.7 °C)   Weight: 52.6 kg (116 lb)   Height: 161.3 cm (63.5\")     Body mass index is 20.23 kg/m².      Physical Exam  Neurological:      Mental Status: She is alert and oriented to person, place, and time.       Neurologic Exam     Mental Status   Oriented to person, place, and time.           Assessment/Plan   Independent Review of Radiographic Studies:      I personally reviewed the images from the following studies.    I reviewed her plain films, myelogram, and CT scan myself.  Her plain films show some osteoporosis but no obvious instability on flexion and extension.  On the myelogram there is some lateral recess narrowing at L2-3 and L3-4 but worse on the left than the right.  There is a subtotal block at L4-5.  On the post " myelographic CT scan there is severe stenosis at L4-5 but L5-S1 looks okay.  There is only moderate narrowing at L3-4 and it is worse on the left than the right.  The levels above that show only minimal spondylitic change.    Medical Decision Making:      I told the patient and her son about the imaging.  I told him that we could certainly consider surgery at L4-5 on the right side using minimally invasive techniques.  The patient clearly has an element of dementia as she thought the myelogram was surgery.  I explained to both of them that people with memory problems and dementia have a very difficult time with surgery and that they become agitated and oftentimes the pain is more severe than it would normally be because they do not interpret pain the same as people without mental status change.  On the other hand she does have severe pain and a significant abnormality and so I think it is reasonable to consider surgery as long as they understand that.  I told the patient about the risks, complications and expected outcome of the lumbar surgery.  I explained that there was an 80% chance of getting rid of the pain in the leg.  I explained that there would still be back pain after the surgery.  Initially this will be quite severe but will improve over time.  There is a 2 or 3% chance of infection, bleeding, CSF leak, damage to the nerve as a result of surgery, paralysis, as well as anesthetic risk.  There is a 5% chance of late instability which could require a fusion later on and a 10% chance of recurrent disc herniation.  We discussed the postoperative hospital and home course.    They will think about it and call if they wish to proceed.  If they call they need to be scheduled for a: Right lumbar 4 5 laminectomy with metrx    Diagnoses and all orders for this visit:    1. Spinal stenosis of lumbar region with neurogenic claudication (Primary)      Return for 2-3 week post op.

## 2021-03-04 ENCOUNTER — OFFICE VISIT (OUTPATIENT)
Dept: NEUROSURGERY | Facility: CLINIC | Age: 86
End: 2021-03-04

## 2021-03-04 VITALS
DIASTOLIC BLOOD PRESSURE: 61 MMHG | SYSTOLIC BLOOD PRESSURE: 140 MMHG | HEIGHT: 64 IN | BODY MASS INDEX: 19.81 KG/M2 | HEART RATE: 78 BPM | TEMPERATURE: 98 F | WEIGHT: 116 LBS

## 2021-03-04 DIAGNOSIS — M48.062 SPINAL STENOSIS OF LUMBAR REGION WITH NEUROGENIC CLAUDICATION: Primary | ICD-10-CM

## 2021-03-04 PROCEDURE — 99213 OFFICE O/P EST LOW 20 MIN: CPT | Performed by: NEUROLOGICAL SURGERY

## 2021-03-04 RX ORDER — NAPROXEN 500 MG/1
TABLET, DELAYED RELEASE ORAL
COMMUNITY
Start: 2020-12-08 | End: 2021-05-13

## 2021-03-04 RX ORDER — METHOCARBAMOL 750 MG/1
TABLET, FILM COATED ORAL
COMMUNITY
Start: 2020-12-08 | End: 2022-05-19

## 2021-05-13 ENCOUNTER — OFFICE VISIT (OUTPATIENT)
Dept: CARDIOLOGY | Facility: CLINIC | Age: 86
End: 2021-05-13

## 2021-05-13 VITALS
DIASTOLIC BLOOD PRESSURE: 60 MMHG | BODY MASS INDEX: 20.14 KG/M2 | SYSTOLIC BLOOD PRESSURE: 142 MMHG | HEIGHT: 64 IN | HEART RATE: 63 BPM | WEIGHT: 118 LBS

## 2021-05-13 DIAGNOSIS — R00.2 PALPITATIONS: ICD-10-CM

## 2021-05-13 DIAGNOSIS — I10 ESSENTIAL HYPERTENSION: Primary | ICD-10-CM

## 2021-05-13 DIAGNOSIS — E78.5 HYPERLIPIDEMIA, UNSPECIFIED HYPERLIPIDEMIA TYPE: ICD-10-CM

## 2021-05-13 DIAGNOSIS — I35.1 TRACE AORTIC VALVE REGURGITATION: ICD-10-CM

## 2021-05-13 DIAGNOSIS — I51.89 GRADE I DIASTOLIC DYSFUNCTION: ICD-10-CM

## 2021-05-13 DIAGNOSIS — I35.0 AORTIC STENOSIS, MILD: ICD-10-CM

## 2021-05-13 PROCEDURE — 99214 OFFICE O/P EST MOD 30 MIN: CPT | Performed by: INTERNAL MEDICINE

## 2021-05-13 NOTE — PROGRESS NOTES
Chief Complaint  Hypertension    Subjective    History of Present Illness       I saw Rossana Gomez today for continued cardiovascular care.  She is a very sweet 86-year-old female who observes the CDC guidelines during the coronavirus pandemic.  Rossana has received her COVID-19 vaccine.  She is free of chest pain pressure or tightness.  She does not report any significant or progressive dyspnea on exertion.  She remains free of orthopnea PND or lower extremity edema.  She denies syncope near syncope or any significant palpitations.  Her blood pressure is adequately controlled.  She has a history of hyperlipidemia remains on pravastatin 40 mg daily which she tolerates reasonably well.  Echocardiogram and nuclear stress test were obtained on 6/16/2020.  She demonstrated preserved left ventricular function, grade 1 diastolic dysfunction, mild aortic stenosis and trace aortic insufficiency, no evidence of ischemia.  Objective   Vital Signs:   There were no vitals taken for this visit.    Constitutional:       Appearance: Well-developed.   Eyes:      Conjunctiva/sclera: Conjunctivae normal.      Pupils: Pupils are equal, round, and reactive to light.   HENT:      Head: Normocephalic and atraumatic.   Neck:      Thyroid: No thyromegaly.   Pulmonary:      Effort: Pulmonary effort is normal. No respiratory distress.      Breath sounds: Normal breath sounds. No wheezing. No rales.   Cardiovascular:      Normal rate. Regular rhythm.      S4 Gallop. No S3 gallop. Midsystolic click click.   Edema:     Peripheral edema absent.   Abdominal:      General: Bowel sounds are normal. There is no distension.      Palpations: Abdomen is soft. There is no abdominal mass.      Tenderness: There is no abdominal tenderness.   Musculoskeletal: Normal range of motion.      Cervical back: Neck supple. Skin:     General: Skin is warm and dry.      Findings: No erythema.   Neurological:      Mental Status: Alert and oriented to person, place,  and time.         Result Review :         Data reviewed: Cardiology studies Echocardiogram and nuclear stress test from 6/16/2020          Assessment and Plan    1. Essential hypertension  Adequately controlled    2. Grade I diastolic dysfunction  Compensated    3. Hyperlipidemia, unspecified hyperlipidemia type  Controlled    4. Aortic stenosis, mild  Stable    5. Trace aortic valve regurgitation  Stable    6. Palpitations  Ms. Gomez admits to decreased activity level during the coronavirus pandemic but has remained free of chest pain pressure tightness and is euvolemic on physical examination.  Her blood pressure is adequately controlled.  Her blood lipids are controlled as well.  I will see her in follow-up in 6 months sooner if needed        Follow Up   No follow-ups on file.  Patient was given instructions and counseling regarding her condition or for health maintenance advice. Please see specific information pulled into the AVS if appropriate.

## 2021-11-17 NOTE — PROGRESS NOTES
"Chief Complaint  No chief complaint on file.    Subjective    History of Present Illness      I saw Rossana Gomez today for continued cardiovascular care.  She is a very pleasant 86-year-old female.  She remains free of any chest pain pressure tightness.  Rossana does not report any progressive dyspnea on exertion nor does she experience orthopnea or PND or lower extremity edema.  She denies syncope near syncope or any significant palpitations. Last echocardiogram reviewed from 6/16/2020 reveals preserved left ventricular function, grade 1 diastolic dysfunction with mild aortic stenosis and trace aortic regurgitation.    Objective   Vital Signs:   /60   Pulse 64   Ht 160 cm (63\")   Wt 58 kg (127 lb 14.4 oz)   BMI 22.66 kg/m²     Constitutional:       Appearance: Well-developed.   Eyes:      Conjunctiva/sclera: Conjunctivae normal.      Pupils: Pupils are equal, round, and reactive to light.   HENT:      Head: Normocephalic and atraumatic.   Neck:      Thyroid: No thyromegaly.   Pulmonary:      Effort: Pulmonary effort is normal. No respiratory distress.      Breath sounds: Normal breath sounds. No wheezing. No rales.   Cardiovascular:      Normal rate. Regular rhythm.      No gallop. No S3 and S4 gallop. No rub.   Edema:     Peripheral edema absent.   Abdominal:      General: Bowel sounds are normal. There is no distension.      Palpations: Abdomen is soft. There is no abdominal mass.      Tenderness: There is no abdominal tenderness.   Musculoskeletal: Normal range of motion.      Cervical back: Neck supple. Skin:     General: Skin is warm and dry.      Findings: No erythema.   Neurological:      Mental Status: Alert and oriented to person, place, and time.         Result Review :                   Assessment and Plan    1. Trace aortic valve regurgitation  Stable    2. Aortic stenosis, mild  Stable    3. Essential hypertension  Target less than 130/80    4. Grade I diastolic " dysfunction  Compensated    5. Dyslipidemia  Low-cholesterol low saturated fat diet and continue pravastatin 40 mg daily    6. Palpitations  Stable    Rossana is free of angina and euvolemic on physical examination.  I have recommended a target blood pressure less than 130/80.  I have encouraged her to restrict her salt intake closer to 2000 mg daily.  Ideally would like to see her  or less.  I will plan to see her in follow-up in 6 months post course sooner if needed.  Have encouraged her to remain active.        Follow Up   No follow-ups on file.  Patient was given instructions and counseling regarding her condition or for health maintenance advice. Please see specific information pulled into the AVS if appropriate.

## 2021-11-18 ENCOUNTER — OFFICE VISIT (OUTPATIENT)
Dept: CARDIOLOGY | Facility: CLINIC | Age: 86
End: 2021-11-18

## 2021-11-18 VITALS
BODY MASS INDEX: 22.66 KG/M2 | HEIGHT: 63 IN | SYSTOLIC BLOOD PRESSURE: 148 MMHG | DIASTOLIC BLOOD PRESSURE: 56 MMHG | WEIGHT: 127.9 LBS | HEART RATE: 64 BPM

## 2021-11-18 DIAGNOSIS — I35.1 TRACE AORTIC VALVE REGURGITATION: Primary | ICD-10-CM

## 2021-11-18 DIAGNOSIS — R00.2 PALPITATIONS: ICD-10-CM

## 2021-11-18 DIAGNOSIS — I51.89 GRADE I DIASTOLIC DYSFUNCTION: ICD-10-CM

## 2021-11-18 DIAGNOSIS — I10 ESSENTIAL HYPERTENSION: ICD-10-CM

## 2021-11-18 DIAGNOSIS — I35.0 AORTIC STENOSIS, MILD: ICD-10-CM

## 2021-11-18 DIAGNOSIS — E78.5 DYSLIPIDEMIA: ICD-10-CM

## 2021-11-18 PROCEDURE — 99214 OFFICE O/P EST MOD 30 MIN: CPT | Performed by: INTERNAL MEDICINE

## 2021-11-29 RX ORDER — LOSARTAN POTASSIUM 25 MG/1
TABLET ORAL
Qty: 90 TABLET | Refills: 3 | Status: SHIPPED | OUTPATIENT
Start: 2021-11-29

## 2022-05-18 NOTE — PROGRESS NOTES
"Chief Complaint  Hypertension    Subjective    History of Present Illness      I saw Rossana Gomez today for continued cardiovascular care.  She is a pleasant 87-year-old female who remains active.  She is free of chest pains pressure or tightness.  She does not experience any significant progression of her baseline dyspnea on exertion.  She remains free of orthopnea or PND no lower extremity edema.  She is free of syncope near syncope or palpitations.  On her last echocardiogram 6/16/2020 she demonstrated preserved left ventricular contractility, grade 1 diastolic dysfunction, mild aortic stenosis and trace aortic regurgitation.  Her blood pressure is controlled.  Her lipids from 2/8/2022 are controlled as well.    Objective   Vital Signs:   /64   Pulse 63   Ht 160 cm (63\")   Wt 59.9 kg (132 lb)   BMI 23.38 kg/m²     Constitutional:       Appearance: Well-developed.   Eyes:      Conjunctiva/sclera: Conjunctivae normal.      Pupils: Pupils are equal, round, and reactive to light.   HENT:      Head: Normocephalic and atraumatic.   Neck:      Thyroid: No thyromegaly.   Pulmonary:      Effort: Pulmonary effort is normal. No respiratory distress.      Breath sounds: Normal breath sounds. No wheezing. No rales.   Cardiovascular:      Normal rate. Regular rhythm.      Murmurs: There is a grade 1/6 harsh midsystolic murmur at the URSB, radiating to the neck.      No gallop. No S3 and S4 gallop. No rub.   Edema:     Peripheral edema absent.   Abdominal:      General: Bowel sounds are normal. There is no distension.      Palpations: Abdomen is soft. There is no abdominal mass.      Tenderness: There is no abdominal tenderness.   Musculoskeletal: Normal range of motion.      Cervical back: Neck supple. Skin:     General: Skin is warm and dry.      Findings: No erythema.   Neurological:      Mental Status: Alert and oriented to person, place, and time.         Result Review :                   Assessment and Plan " {CC Problem List  Visit Diagnosis  ROS  Review (Popup)  Health Maintenance  Quality  BestPractice  Medications  SmartSets  SnapShot Encounters  Media :23}   1. Trace aortic valve regurgitation  Stable and compensated    2. Aortic stenosis, mild  Asymptomatic    3. Essential hypertension  Controlled    4. Grade I diastolic dysfunction  Stable    5. Dyslipidemia  Controlled    6. Palpitations  Stable    7. Spinal stenosis of lumbar region with neurogenic claudication      Rossana remained stable from a cardiovascular standpoint.  She is free of angina and euvolemic on examination.  Blood pressure and lipids are controlled.  I will see her in follow-up in 6 months at which time we plan to repeat an echocardiogram.        Follow Up   No follow-ups on file.  Patient was given instructions and counseling regarding her condition or for health maintenance advice. Please see specific information pulled into the AVS if appropriate.

## 2022-05-19 ENCOUNTER — OFFICE VISIT (OUTPATIENT)
Dept: CARDIOLOGY | Facility: CLINIC | Age: 87
End: 2022-05-19

## 2022-05-19 VITALS
DIASTOLIC BLOOD PRESSURE: 64 MMHG | HEIGHT: 63 IN | WEIGHT: 132 LBS | BODY MASS INDEX: 23.39 KG/M2 | SYSTOLIC BLOOD PRESSURE: 132 MMHG | HEART RATE: 63 BPM

## 2022-05-19 DIAGNOSIS — I35.1 TRACE AORTIC VALVE REGURGITATION: Primary | ICD-10-CM

## 2022-05-19 DIAGNOSIS — E78.5 DYSLIPIDEMIA: ICD-10-CM

## 2022-05-19 DIAGNOSIS — I51.89 GRADE I DIASTOLIC DYSFUNCTION: ICD-10-CM

## 2022-05-19 DIAGNOSIS — I10 ESSENTIAL HYPERTENSION: ICD-10-CM

## 2022-05-19 DIAGNOSIS — M48.062 SPINAL STENOSIS OF LUMBAR REGION WITH NEUROGENIC CLAUDICATION: ICD-10-CM

## 2022-05-19 DIAGNOSIS — I35.0 AORTIC STENOSIS, MILD: ICD-10-CM

## 2022-05-19 DIAGNOSIS — R00.2 PALPITATIONS: ICD-10-CM

## 2022-05-19 PROCEDURE — 99214 OFFICE O/P EST MOD 30 MIN: CPT | Performed by: INTERNAL MEDICINE
